# Patient Record
Sex: FEMALE | Race: WHITE | NOT HISPANIC OR LATINO | URBAN - METROPOLITAN AREA
[De-identification: names, ages, dates, MRNs, and addresses within clinical notes are randomized per-mention and may not be internally consistent; named-entity substitution may affect disease eponyms.]

---

## 2021-11-18 ENCOUNTER — INPATIENT (INPATIENT)
Facility: HOSPITAL | Age: 86
LOS: 4 days | Discharge: HOME | DRG: 189 | End: 2021-11-23
Attending: STUDENT IN AN ORGANIZED HEALTH CARE EDUCATION/TRAINING PROGRAM | Admitting: STUDENT IN AN ORGANIZED HEALTH CARE EDUCATION/TRAINING PROGRAM
Payer: MEDICARE

## 2021-11-18 VITALS
WEIGHT: 111.99 LBS | DIASTOLIC BLOOD PRESSURE: 74 MMHG | HEART RATE: 80 BPM | SYSTOLIC BLOOD PRESSURE: 146 MMHG | TEMPERATURE: 99 F | RESPIRATION RATE: 20 BRPM | OXYGEN SATURATION: 99 % | HEIGHT: 68 IN

## 2021-11-18 DIAGNOSIS — J96.01 ACUTE RESPIRATORY FAILURE WITH HYPOXIA: ICD-10-CM

## 2021-11-18 LAB
ALBUMIN SERPL ELPH-MCNC: 3.5 G/DL — SIGNIFICANT CHANGE UP (ref 3.3–5.2)
ALP SERPL-CCNC: 76 U/L — SIGNIFICANT CHANGE UP (ref 40–120)
ALT FLD-CCNC: 10 U/L — SIGNIFICANT CHANGE UP
ANION GAP SERPL CALC-SCNC: 10 MMOL/L — SIGNIFICANT CHANGE UP (ref 5–17)
APPEARANCE UR: ABNORMAL
APTT BLD: 27.2 SEC — LOW (ref 27.5–35.5)
AST SERPL-CCNC: 20 U/L — SIGNIFICANT CHANGE UP
BACTERIA # UR AUTO: ABNORMAL
BASE EXCESS BLDA CALC-SCNC: 14.8 MMOL/L — HIGH (ref -2–3)
BASE EXCESS BLDA CALC-SCNC: 14.8 MMOL/L — HIGH (ref -2–3)
BASOPHILS # BLD AUTO: 0.03 K/UL — SIGNIFICANT CHANGE UP (ref 0–0.2)
BASOPHILS NFR BLD AUTO: 0.5 % — SIGNIFICANT CHANGE UP (ref 0–2)
BILIRUB SERPL-MCNC: 0.4 MG/DL — SIGNIFICANT CHANGE UP (ref 0.4–2)
BILIRUB UR-MCNC: NEGATIVE — SIGNIFICANT CHANGE UP
BLD GP AB SCN SERPL QL: SIGNIFICANT CHANGE UP
BLOOD GAS COMMENTS ARTERIAL: SIGNIFICANT CHANGE UP
BUN SERPL-MCNC: 28.8 MG/DL — HIGH (ref 8–20)
CALCIUM SERPL-MCNC: 9.6 MG/DL — SIGNIFICANT CHANGE UP (ref 8.6–10.2)
CHLORIDE SERPL-SCNC: 101 MMOL/L — SIGNIFICANT CHANGE UP (ref 98–107)
CO2 SERPL-SCNC: 34 MMOL/L — HIGH (ref 22–29)
COLOR SPEC: YELLOW — SIGNIFICANT CHANGE UP
COMMENT - URINE: SIGNIFICANT CHANGE UP
CREAT SERPL-MCNC: 0.99 MG/DL — SIGNIFICANT CHANGE UP (ref 0.5–1.3)
DIFF PNL FLD: NEGATIVE — SIGNIFICANT CHANGE UP
EOSINOPHIL # BLD AUTO: 0.02 K/UL — SIGNIFICANT CHANGE UP (ref 0–0.5)
EOSINOPHIL NFR BLD AUTO: 0.3 % — SIGNIFICANT CHANGE UP (ref 0–6)
EPI CELLS # UR: SIGNIFICANT CHANGE UP
GAS PNL BLDA: SIGNIFICANT CHANGE UP
GLUCOSE BLDC GLUCOMTR-MCNC: 112 MG/DL — HIGH (ref 70–99)
GLUCOSE SERPL-MCNC: 114 MG/DL — HIGH (ref 70–99)
GLUCOSE UR QL: NEGATIVE MG/DL — SIGNIFICANT CHANGE UP
GRAN CASTS # UR COMP ASSIST: ABNORMAL /LPF
HCO3 BLDA-SCNC: 40 MMOL/L — HIGH (ref 21–28)
HCO3 BLDA-SCNC: 41 MMOL/L — HIGH (ref 21–28)
HCT VFR BLD CALC: 42.3 % — SIGNIFICANT CHANGE UP (ref 34.5–45)
HGB BLD-MCNC: 12.5 G/DL — SIGNIFICANT CHANGE UP (ref 11.5–15.5)
HIV 1 & 2 AB SERPL IA.RAPID: SIGNIFICANT CHANGE UP
HOROWITZ INDEX BLDA+IHG-RTO: 0.4 — SIGNIFICANT CHANGE UP
IMM GRANULOCYTES NFR BLD AUTO: 0.5 % — SIGNIFICANT CHANGE UP (ref 0–1.5)
INR BLD: 1.13 RATIO — SIGNIFICANT CHANGE UP (ref 0.88–1.16)
KETONES UR-MCNC: NEGATIVE — SIGNIFICANT CHANGE UP
LEUKOCYTE ESTERASE UR-ACNC: NEGATIVE — SIGNIFICANT CHANGE UP
LYMPHOCYTES # BLD AUTO: 0.53 K/UL — LOW (ref 1–3.3)
LYMPHOCYTES # BLD AUTO: 8 % — LOW (ref 13–44)
MCHC RBC-ENTMCNC: 27.5 PG — SIGNIFICANT CHANGE UP (ref 27–34)
MCHC RBC-ENTMCNC: 29.6 GM/DL — LOW (ref 32–36)
MCV RBC AUTO: 93.2 FL — SIGNIFICANT CHANGE UP (ref 80–100)
MONOCYTES # BLD AUTO: 0.3 K/UL — SIGNIFICANT CHANGE UP (ref 0–0.9)
MONOCYTES NFR BLD AUTO: 4.6 % — SIGNIFICANT CHANGE UP (ref 2–14)
NEUTROPHILS # BLD AUTO: 5.68 K/UL — SIGNIFICANT CHANGE UP (ref 1.8–7.4)
NEUTROPHILS NFR BLD AUTO: 86.1 % — HIGH (ref 43–77)
NITRITE UR-MCNC: NEGATIVE — SIGNIFICANT CHANGE UP
NT-PROBNP SERPL-SCNC: 1913 PG/ML — HIGH (ref 0–300)
PCO2 BLDA: 64 MMHG — HIGH (ref 32–35)
PCO2 BLDA: 86 MMHG — CRITICAL HIGH (ref 32–35)
PH BLDA: 7.29 — LOW (ref 7.35–7.45)
PH BLDA: 7.4 — SIGNIFICANT CHANGE UP (ref 7.35–7.45)
PH UR: 6 — SIGNIFICANT CHANGE UP (ref 5–8)
PLATELET # BLD AUTO: 181 K/UL — SIGNIFICANT CHANGE UP (ref 150–400)
PO2 BLDA: 147 MMHG — HIGH (ref 83–108)
PO2 BLDA: 89 MMHG — SIGNIFICANT CHANGE UP (ref 83–108)
POTASSIUM SERPL-MCNC: 4.7 MMOL/L — SIGNIFICANT CHANGE UP (ref 3.5–5.3)
POTASSIUM SERPL-SCNC: 4.7 MMOL/L — SIGNIFICANT CHANGE UP (ref 3.5–5.3)
PROT SERPL-MCNC: 6.7 G/DL — SIGNIFICANT CHANGE UP (ref 6.6–8.7)
PROT UR-MCNC: 15 MG/DL
PROTHROM AB SERPL-ACNC: 13 SEC — SIGNIFICANT CHANGE UP (ref 10.6–13.6)
RAPID RVP RESULT: SIGNIFICANT CHANGE UP
RBC # BLD: 4.54 M/UL — SIGNIFICANT CHANGE UP (ref 3.8–5.2)
RBC # FLD: 13.5 % — SIGNIFICANT CHANGE UP (ref 10.3–14.5)
RBC CASTS # UR COMP ASSIST: SIGNIFICANT CHANGE UP /HPF (ref 0–4)
SAO2 % BLDA: 97.9 % — SIGNIFICANT CHANGE UP (ref 94–98)
SAO2 % BLDA: 99.6 % — HIGH (ref 94–98)
SARS-COV-2 RNA SPEC QL NAA+PROBE: SIGNIFICANT CHANGE UP
SODIUM SERPL-SCNC: 145 MMOL/L — SIGNIFICANT CHANGE UP (ref 135–145)
SP GR SPEC: 1.02 — SIGNIFICANT CHANGE UP (ref 1.01–1.02)
TROPONIN T SERPL-MCNC: 0.05 NG/ML — SIGNIFICANT CHANGE UP (ref 0–0.06)
UROBILINOGEN FLD QL: NEGATIVE MG/DL — SIGNIFICANT CHANGE UP
WBC # BLD: 6.59 K/UL — SIGNIFICANT CHANGE UP (ref 3.8–10.5)
WBC # FLD AUTO: 6.59 K/UL — SIGNIFICANT CHANGE UP (ref 3.8–10.5)
WBC UR QL: SIGNIFICANT CHANGE UP

## 2021-11-18 PROCEDURE — 99291 CRITICAL CARE FIRST HOUR: CPT | Mod: CS,GC

## 2021-11-18 PROCEDURE — 72125 CT NECK SPINE W/O DYE: CPT | Mod: 26,MD

## 2021-11-18 PROCEDURE — 70450 CT HEAD/BRAIN W/O DYE: CPT | Mod: 26,MD

## 2021-11-18 PROCEDURE — 71045 X-RAY EXAM CHEST 1 VIEW: CPT | Mod: 26

## 2021-11-18 PROCEDURE — 72192 CT PELVIS W/O DYE: CPT | Mod: 26,MA

## 2021-11-18 PROCEDURE — 99223 1ST HOSP IP/OBS HIGH 75: CPT

## 2021-11-18 PROCEDURE — 93010 ELECTROCARDIOGRAM REPORT: CPT

## 2021-11-18 RX ORDER — FUROSEMIDE 40 MG
40 TABLET ORAL ONCE
Refills: 0 | Status: COMPLETED | OUTPATIENT
Start: 2021-11-18 | End: 2021-11-18

## 2021-11-18 RX ORDER — DEXTROSE 50 % IN WATER 50 %
12.5 SYRINGE (ML) INTRAVENOUS ONCE
Refills: 0 | Status: DISCONTINUED | OUTPATIENT
Start: 2021-11-18 | End: 2021-11-23

## 2021-11-18 RX ORDER — ASPIRIN/CALCIUM CARB/MAGNESIUM 324 MG
300 TABLET ORAL DAILY
Refills: 0 | Status: DISCONTINUED | OUTPATIENT
Start: 2021-11-18 | End: 2021-11-19

## 2021-11-18 RX ORDER — SODIUM CHLORIDE 9 MG/ML
500 INJECTION INTRAMUSCULAR; INTRAVENOUS; SUBCUTANEOUS ONCE
Refills: 0 | Status: DISCONTINUED | OUTPATIENT
Start: 2021-11-18 | End: 2021-11-18

## 2021-11-18 RX ORDER — ACETAMINOPHEN 500 MG
650 TABLET ORAL EVERY 6 HOURS
Refills: 0 | Status: DISCONTINUED | OUTPATIENT
Start: 2021-11-18 | End: 2021-11-23

## 2021-11-18 RX ORDER — ASPIRIN/CALCIUM CARB/MAGNESIUM 324 MG
81 TABLET ORAL DAILY
Refills: 0 | Status: DISCONTINUED | OUTPATIENT
Start: 2021-11-18 | End: 2021-11-18

## 2021-11-18 RX ORDER — SODIUM CHLORIDE 9 MG/ML
1000 INJECTION, SOLUTION INTRAVENOUS
Refills: 0 | Status: DISCONTINUED | OUTPATIENT
Start: 2021-11-18 | End: 2021-11-23

## 2021-11-18 RX ORDER — GLUCAGON INJECTION, SOLUTION 0.5 MG/.1ML
1 INJECTION, SOLUTION SUBCUTANEOUS ONCE
Refills: 0 | Status: DISCONTINUED | OUTPATIENT
Start: 2021-11-18 | End: 2021-11-23

## 2021-11-18 RX ORDER — DEXTROSE 50 % IN WATER 50 %
25 SYRINGE (ML) INTRAVENOUS ONCE
Refills: 0 | Status: DISCONTINUED | OUTPATIENT
Start: 2021-11-18 | End: 2021-11-23

## 2021-11-18 RX ORDER — METOPROLOL TARTRATE 50 MG
25 TABLET ORAL
Refills: 0 | Status: DISCONTINUED | OUTPATIENT
Start: 2021-11-18 | End: 2021-11-18

## 2021-11-18 RX ORDER — ONDANSETRON 8 MG/1
4 TABLET, FILM COATED ORAL EVERY 8 HOURS
Refills: 0 | Status: DISCONTINUED | OUTPATIENT
Start: 2021-11-18 | End: 2021-11-23

## 2021-11-18 RX ORDER — METOPROLOL TARTRATE 50 MG
5 TABLET ORAL EVERY 6 HOURS
Refills: 0 | Status: DISCONTINUED | OUTPATIENT
Start: 2021-11-18 | End: 2021-11-19

## 2021-11-18 RX ORDER — SODIUM CHLORIDE 9 MG/ML
1000 INJECTION INTRAMUSCULAR; INTRAVENOUS; SUBCUTANEOUS
Refills: 0 | Status: COMPLETED | OUTPATIENT
Start: 2021-11-18 | End: 2021-11-18

## 2021-11-18 RX ORDER — DEXTROSE 50 % IN WATER 50 %
15 SYRINGE (ML) INTRAVENOUS ONCE
Refills: 0 | Status: DISCONTINUED | OUTPATIENT
Start: 2021-11-18 | End: 2021-11-23

## 2021-11-18 RX ORDER — ENOXAPARIN SODIUM 100 MG/ML
30 INJECTION SUBCUTANEOUS DAILY
Refills: 0 | Status: DISCONTINUED | OUTPATIENT
Start: 2021-11-18 | End: 2021-11-23

## 2021-11-18 RX ORDER — LANOLIN ALCOHOL/MO/W.PET/CERES
3 CREAM (GRAM) TOPICAL AT BEDTIME
Refills: 0 | Status: DISCONTINUED | OUTPATIENT
Start: 2021-11-18 | End: 2021-11-23

## 2021-11-18 RX ORDER — AZITHROMYCIN 500 MG/1
500 TABLET, FILM COATED ORAL EVERY 24 HOURS
Refills: 0 | Status: COMPLETED | OUTPATIENT
Start: 2021-11-18 | End: 2021-11-22

## 2021-11-18 RX ADMIN — AZITHROMYCIN 255 MILLIGRAM(S): 500 TABLET, FILM COATED ORAL at 18:54

## 2021-11-18 RX ADMIN — Medication 40 MILLIGRAM(S): at 18:31

## 2021-11-18 RX ADMIN — ENOXAPARIN SODIUM 30 MILLIGRAM(S): 100 INJECTION SUBCUTANEOUS at 18:30

## 2021-11-18 RX ADMIN — Medication 40 MILLIGRAM(S): at 16:10

## 2021-11-18 RX ADMIN — SODIUM CHLORIDE 55 MILLILITER(S): 9 INJECTION INTRAMUSCULAR; INTRAVENOUS; SUBCUTANEOUS at 18:30

## 2021-11-18 NOTE — ED ADULT NURSE NOTE - OBJECTIVE STATEMENT
Patient A&O, here for weakness and an unwitnessed fall, as per daughter. As per daughter, patient slid off of her bed yesterday with no apparent injuries. Patient has been reported to have been physically declining over the past two weeks and was cognitively intact yesterday. Patent's daughter reported patient is b12 deficient, has a HX of HTN, CHF, and takes a baby aspirin daily. Patient is not on medication for CHF at this time. Patient placed on 2 liters of 02. O2 stat in the high 80's on room air.

## 2021-11-18 NOTE — ED ADULT TRIAGE NOTE - CHIEF COMPLAINT QUOTE
generalized weakness unknown onset of symptoms. denies falling denies loc denies blood thinners. pt has hx alzheimers. confused at baseline

## 2021-11-18 NOTE — ED PROVIDER NOTE - PROGRESS NOTE DETAILS
Stan: SAMARA completed with family.  On reevaluation, pt more obtunded.  Repeat ABG showing worsening hypercarbia.  Placed on AVAPS.  Will give lasix. patient on AVAPS, responding to name now. will continue to monitor

## 2021-11-18 NOTE — ED PROVIDER NOTE - CLINICAL SUMMARY MEDICAL DECISION MAKING FREE TEXT BOX
89y F w/ dementia, CHF, COPD, presenting for fall/AMS.  Pt lethargic and satting 86% on RA on arrival.  Placed on 2 L NC.  Will obtain trauma workup, ABG, labs, EKG, CXR, UA, admit.

## 2021-11-18 NOTE — ED PROVIDER NOTE - OBJECTIVE STATEMENT
89y F w/ hx CHF, COPD, Alzheimer's dementia, presenting for fall/altered mental status.  Per daughter, pt had been otherwise in her usual state of health over the past few days.  Pt normally ambulatory at baseline; just went to [facility] yesterday.  This morning, daughter heard call to her, and found pt on the ground out of bed.  Pt more lethargic compared to baseline; denies any complaints.  No blood thinner use.  Pt is DNR/DNI.

## 2021-11-18 NOTE — H&P ADULT - NSHPPHYSICALEXAM_GEN_ALL_CORE
GENERAL: elderly frail female examined bedside, appears uncomfortable moderate distress  HEENT: NC/AT, dry oral mucosa, clear conjunctiva, sclera nonicteric  RESPIRATORY: increased WOB, on AVAPS, decreased air entry b/l, no audible wheezing, rhonchi, rales appreciated  CARDIOVASCULAR: RRR, normal S1 and S2  ABDOMEN: soft, NT/ND, normoactive bowel sounds, no rebound/guarding  MSK: No joint deformities, edema, erythema  EXTREMITIES: No cynaosis, no clubbing, no lower extremity edema; Peripheral pulses are 2+ bilaterally  PSYCH: unable to assess as pt is lethargic   NEUROLOGY: not awake but withdraws to noxious stimuli  SKIN: No rashes or no palpable lesions

## 2021-11-18 NOTE — ED PROVIDER NOTE - PHYSICAL EXAMINATION
Constitutional: Awake, alert, lethargic but easily rousable, in no acute distress  Eyes: no scleral icterus  HENT: normocephalic, atraumatic, moist oral mucosa  Neck: supple, no tenderness  CV: RRR, no murmur  Pulm: non-labored respirations, CTAB  Abdomen: soft, non-tender, non-distended  Extremities: no edema, no deformity, no tenderness or decreased ROM  Skin: no rash, no jaundice  Neuro: AAOx1, moving all extremities equally

## 2021-11-18 NOTE — H&P ADULT - NSHPLABSRESULTS_GEN_ALL_CORE
12.5   6.59  )-----------( 181      ( 18 Nov 2021 09:18 )             42.3         11-18    145  |  101  |  28.8<H>  ----------------------------<  114<H>  4.7   |  34.0<H>  |  0.99    Ca    9.6      18 Nov 2021 09:18    TPro  6.7  /  Alb  3.5  /  TBili  0.4  /  DBili  x   /  AST  20  /  ALT  10  /  AlkPhos  76  11-18        < from: CT Head No Cont (11.18.21 @ 11:01) >    Brain:    Extensive parenchymal volume loss and chronic microvascular ischemic changes are identified    There is no evidence acute hemorrhage mass or mass effect seen    Evaluation of the osseous structures with the appropriate window appears normal    The patient is status post bilateral cataract surgery.    The patient is status post bilateral scleral banding.    The visualized paranasal sinuses mastoid and middle ear regions appear clear.    Cervical spine:    Demineralization of the bones is seen.    Loss of the normal cervical lordosis is seen    Scoliosis is seen.    C4 is slightly anteriorly displaced relative to C5. This likely the result of chronic degenerative change    Bilateral hypertrophic facet joint is seen at multiple levels. This is secondary to chronic degenerative change    There are no acute fractures or dislocations identified    Evaluation of the paraspinal soft tissues is limited by lack of IV contrast. There is a left-sided thyroid lesion. Dedicated imaging of the thyroid can be done with CT scan or ultrasound if clinically indicated.    The visualized portions of both lung apices appear clear.    IMPRESSION: No evidence acute hemorrhage mass mass effect in the posterior fossa or supratentorial region.    Degenerative change involving the cervical spine without evidence of fracture or dislocation seen.    Left thyroid lesion.    < end of copied text >

## 2021-11-18 NOTE — H&P ADULT - NSHPSOCIALHISTORY_GEN_ALL_CORE
lives with daughter, needs moderate assistance with ADLs, former smoker, no etoh or illicit drug use

## 2021-11-18 NOTE — H&P ADULT - ASSESSMENT
89 y/o F w/ PMH of advanced dementia, HTN, COPD was BIBA from respiratory distress.  Pt unable to provide history at this time and was obtained from daughter.  Pt lives with daughter who noted her to have increased work of breathing for the past few days. Daughter also reports that pt slid out of bed yesterday falling to the ground slowly and but had no LOC or head trauma.  On arrival to the ED pt noted to be hypoxic w/ O2 sat 86 ORA w/ increased wob and placed on supplemental O2.  Pt became more lethargic and ABG was performed which showed PaCO2 86 and patient was placed on NIV in the form of AVAPS.  Pt will be admitted to the SDU for further management.        Acute hypoxic hypercapnic respiratory failure possibly 2/2 COPD exacerbation   - Admit to step down unit  - CXR reviewed and no obvious consolidation or infiltrate; report pending    - Respiratory acidosis on ABG w/ PaCO2 86 on room air   - Placed on AVAPS in ED, will do repeat ABG to reassess acid base disturbance  - Will place on short course of IV steroids and azithromycin     - Aspiration precautions and HOB elevation to 30 degrees  - Frequent suctioning of secretions if needed  - Chest PT as tolerated    - NPO while on NIV      Acute metabolic encephalopathy 2/2 CO2 narcosis   - ABG reviwed and CO2 >80  - Mild improvement in mentation since placing on AVAPS  - Will order repeat ABG to reassess CO2   - CTH negative for acute pathology   - Monitor mentation       HTN  - Metoprolol 25mg BID      Advanced dementia   - On Nam[employer] but non formulary       VTE ppx: lovenox SQ    Code Status: DNR/DNI

## 2021-11-18 NOTE — H&P ADULT - HISTORY OF PRESENT ILLNESS
89 y/o F w/ PMH of advanced dementia, HTN was BIBA from respiratory distress.  Pt unable to provide history at this time and was obtained from daughter.  Pt lives with daughter who noted her to have increased work of breathing for the past few days.  She used an inhaler that provided no relief.  Today daughter noted pt to be breathing very fast and shallow and was less responsive.  Daughter denies pt being febrile or having cough.  She has had no sick contacts that she knows of.   Daughter also reports that pt slid out of bed yesterday falling to the ground slowly and but had no LOC or head trauma.

## 2021-11-18 NOTE — ED PROVIDER NOTE - NSICDXPASTMEDICALHX_GEN_ALL_CORE_FT
PAST MEDICAL HISTORY:  Alzheimer disease     Chronic obstructive pulmonary disease (COPD)     Congestive heart failure (CHF)

## 2021-11-18 NOTE — ED PROVIDER NOTE - ATTENDING CONTRIBUTION TO CARE
89y F w/ hx CHF, HTN, Alzheimer's dementia, presenting for fall/altered mental status. Baseline ambulatory. This morning found patient on ground, slipped out of bed. In the last week patient more tired than usual, getting B12 therapy recently. Not on home O2. never smoker. Takes metoprolol and aspirin only. Was able to go to [facility] yesterday at normal activity level. Patient unable to provide history. Vaccinated against covid19. Daughter denies recent fever, abd pain, diarrhea.  Ap - arousable to voice. hypoxic to 86% on RA. not on home o2. no signs of volume overload. will get rectal temp. infectious w/up. CT r/o traumatic injury. anticipate admission

## 2021-11-19 LAB
ALBUMIN SERPL ELPH-MCNC: 3.6 G/DL — SIGNIFICANT CHANGE UP (ref 3.3–5.2)
ALP SERPL-CCNC: 73 U/L — SIGNIFICANT CHANGE UP (ref 40–120)
ALT FLD-CCNC: 8 U/L — SIGNIFICANT CHANGE UP
ANION GAP SERPL CALC-SCNC: 13 MMOL/L — SIGNIFICANT CHANGE UP (ref 5–17)
AST SERPL-CCNC: 16 U/L — SIGNIFICANT CHANGE UP
BASE EXCESS BLDA CALC-SCNC: 13.4 MMOL/L — HIGH (ref -2–3)
BASE EXCESS BLDA CALC-SCNC: 17.5 MMOL/L — HIGH (ref -2–3)
BASOPHILS # BLD AUTO: 0 K/UL — SIGNIFICANT CHANGE UP (ref 0–0.2)
BASOPHILS NFR BLD AUTO: 0 % — SIGNIFICANT CHANGE UP (ref 0–2)
BILIRUB SERPL-MCNC: 0.6 MG/DL — SIGNIFICANT CHANGE UP (ref 0.4–2)
BLOOD GAS COMMENTS ARTERIAL: SIGNIFICANT CHANGE UP
BLOOD GAS COMMENTS ARTERIAL: SIGNIFICANT CHANGE UP
BUN SERPL-MCNC: 31.5 MG/DL — HIGH (ref 8–20)
CALCIUM SERPL-MCNC: 8.9 MG/DL — SIGNIFICANT CHANGE UP (ref 8.6–10.2)
CHLORIDE SERPL-SCNC: 96 MMOL/L — LOW (ref 98–107)
CO2 SERPL-SCNC: 35 MMOL/L — HIGH (ref 22–29)
COVID-19 NUCLEOCAPSID GAM AB INTERP: NEGATIVE — SIGNIFICANT CHANGE UP
COVID-19 NUCLEOCAPSID TOTAL GAM ANTIBODY RESULT: 0.08 INDEX — SIGNIFICANT CHANGE UP
COVID-19 SPIKE DOMAIN AB INTERP: POSITIVE
COVID-19 SPIKE DOMAIN ANTIBODY RESULT: >250 U/ML — HIGH
CREAT SERPL-MCNC: 1.07 MG/DL — SIGNIFICANT CHANGE UP (ref 0.5–1.3)
EOSINOPHIL # BLD AUTO: 0 K/UL — SIGNIFICANT CHANGE UP (ref 0–0.5)
EOSINOPHIL NFR BLD AUTO: 0 % — SIGNIFICANT CHANGE UP (ref 0–6)
GLUCOSE BLDC GLUCOMTR-MCNC: 104 MG/DL — HIGH (ref 70–99)
GLUCOSE BLDC GLUCOMTR-MCNC: 124 MG/DL — HIGH (ref 70–99)
GLUCOSE SERPL-MCNC: 125 MG/DL — HIGH (ref 70–99)
HCO3 BLDA-SCNC: 34 MMOL/L — HIGH (ref 21–28)
HCO3 BLDA-SCNC: 43 MMOL/L — HIGH (ref 21–28)
HCT VFR BLD CALC: 38.4 % — SIGNIFICANT CHANGE UP (ref 34.5–45)
HGB BLD-MCNC: 11.7 G/DL — SIGNIFICANT CHANGE UP (ref 11.5–15.5)
HOROWITZ INDEX BLDA+IHG-RTO: 30 — SIGNIFICANT CHANGE UP
HOROWITZ INDEX BLDA+IHG-RTO: SIGNIFICANT CHANGE UP
IMM GRANULOCYTES NFR BLD AUTO: 0.4 % — SIGNIFICANT CHANGE UP (ref 0–1.5)
LYMPHOCYTES # BLD AUTO: 0.48 K/UL — LOW (ref 1–3.3)
LYMPHOCYTES # BLD AUTO: 10.3 % — LOW (ref 13–44)
MAGNESIUM SERPL-MCNC: 2 MG/DL — SIGNIFICANT CHANGE UP (ref 1.6–2.6)
MCHC RBC-ENTMCNC: 27.5 PG — SIGNIFICANT CHANGE UP (ref 27–34)
MCHC RBC-ENTMCNC: 30.5 GM/DL — LOW (ref 32–36)
MCV RBC AUTO: 90.1 FL — SIGNIFICANT CHANGE UP (ref 80–100)
MONOCYTES # BLD AUTO: 0.2 K/UL — SIGNIFICANT CHANGE UP (ref 0–0.9)
MONOCYTES NFR BLD AUTO: 4.3 % — SIGNIFICANT CHANGE UP (ref 2–14)
NEUTROPHILS # BLD AUTO: 3.98 K/UL — SIGNIFICANT CHANGE UP (ref 1.8–7.4)
NEUTROPHILS NFR BLD AUTO: 85 % — HIGH (ref 43–77)
PCO2 BLDA: 31 MMHG — LOW (ref 32–35)
PCO2 BLDA: 74 MMHG — CRITICAL HIGH (ref 32–35)
PH BLDA: 7.37 — SIGNIFICANT CHANGE UP (ref 7.35–7.45)
PH BLDA: 7.65 — CRITICAL HIGH (ref 7.35–7.45)
PHOSPHATE SERPL-MCNC: 2.9 MG/DL — SIGNIFICANT CHANGE UP (ref 2.4–4.7)
PLATELET # BLD AUTO: 183 K/UL — SIGNIFICANT CHANGE UP (ref 150–400)
PO2 BLDA: 123 MMHG — HIGH (ref 83–108)
PO2 BLDA: 77 MMHG — LOW (ref 83–108)
POTASSIUM SERPL-MCNC: 4.5 MMOL/L — SIGNIFICANT CHANGE UP (ref 3.5–5.3)
POTASSIUM SERPL-SCNC: 4.5 MMOL/L — SIGNIFICANT CHANGE UP (ref 3.5–5.3)
PROT SERPL-MCNC: 6.5 G/DL — LOW (ref 6.6–8.7)
RBC # BLD: 4.26 M/UL — SIGNIFICANT CHANGE UP (ref 3.8–5.2)
RBC # FLD: 13.4 % — SIGNIFICANT CHANGE UP (ref 10.3–14.5)
SAO2 % BLDA: 96.2 % — SIGNIFICANT CHANGE UP (ref 94–98)
SAO2 % BLDA: 99.5 % — HIGH (ref 94–98)
SARS-COV-2 IGG+IGM SERPL QL IA: 0.08 INDEX — SIGNIFICANT CHANGE UP
SARS-COV-2 IGG+IGM SERPL QL IA: >250 U/ML — HIGH
SARS-COV-2 IGG+IGM SERPL QL IA: NEGATIVE — SIGNIFICANT CHANGE UP
SARS-COV-2 IGG+IGM SERPL QL IA: POSITIVE
SODIUM SERPL-SCNC: 144 MMOL/L — SIGNIFICANT CHANGE UP (ref 135–145)
WBC # BLD: 4.68 K/UL — SIGNIFICANT CHANGE UP (ref 3.8–10.5)
WBC # FLD AUTO: 4.68 K/UL — SIGNIFICANT CHANGE UP (ref 3.8–10.5)

## 2021-11-19 PROCEDURE — 99222 1ST HOSP IP/OBS MODERATE 55: CPT

## 2021-11-19 PROCEDURE — 99233 SBSQ HOSP IP/OBS HIGH 50: CPT

## 2021-11-19 PROCEDURE — 74018 RADEX ABDOMEN 1 VIEW: CPT | Mod: 26

## 2021-11-19 RX ORDER — SENNA PLUS 8.6 MG/1
2 TABLET ORAL AT BEDTIME
Refills: 0 | Status: DISCONTINUED | OUTPATIENT
Start: 2021-11-19 | End: 2021-11-23

## 2021-11-19 RX ORDER — POLYETHYLENE GLYCOL 3350 17 G/17G
17 POWDER, FOR SOLUTION ORAL DAILY
Refills: 0 | Status: DISCONTINUED | OUTPATIENT
Start: 2021-11-19 | End: 2021-11-23

## 2021-11-19 RX ORDER — METOPROLOL TARTRATE 50 MG
25 TABLET ORAL
Refills: 0 | Status: DISCONTINUED | OUTPATIENT
Start: 2021-11-20 | End: 2021-11-20

## 2021-11-19 RX ORDER — ASPIRIN/CALCIUM CARB/MAGNESIUM 324 MG
81 TABLET ORAL DAILY
Refills: 0 | Status: DISCONTINUED | OUTPATIENT
Start: 2021-11-19 | End: 2021-11-23

## 2021-11-19 RX ORDER — METOPROLOL TARTRATE 50 MG
25 TABLET ORAL ONCE
Refills: 0 | Status: COMPLETED | OUTPATIENT
Start: 2021-11-19 | End: 2021-11-19

## 2021-11-19 RX ADMIN — Medication 40 MILLIGRAM(S): at 05:28

## 2021-11-19 RX ADMIN — Medication 25 MILLIGRAM(S): at 20:25

## 2021-11-19 RX ADMIN — SENNA PLUS 2 TABLET(S): 8.6 TABLET ORAL at 21:19

## 2021-11-19 RX ADMIN — Medication 81 MILLIGRAM(S): at 14:53

## 2021-11-19 RX ADMIN — POLYETHYLENE GLYCOL 3350 17 GRAM(S): 17 POWDER, FOR SOLUTION ORAL at 17:57

## 2021-11-19 RX ADMIN — AZITHROMYCIN 255 MILLIGRAM(S): 500 TABLET, FILM COATED ORAL at 17:58

## 2021-11-19 RX ADMIN — Medication 5 MILLIGRAM(S): at 13:07

## 2021-11-19 RX ADMIN — ENOXAPARIN SODIUM 30 MILLIGRAM(S): 100 INJECTION SUBCUTANEOUS at 13:07

## 2021-11-19 NOTE — PROGRESS NOTE ADULT - ASSESSMENT
89 y/o F w/ PMH of advanced dementia, HTN, COPD was BIBA from respiratory distress.  Pt unable to provide history at this time and was obtained from daughter.  Pt lives with daughter who noted her to have increased work of breathing for the past few days. Daughter also reports that pt slid out of bed yesterday falling to the ground slowly and but had no LOC or head trauma.  On arrival to the ED pt noted to be hypoxic w/ O2 sat 86 ORA w/ increased wob and placed on supplemental O2.  Pt became more lethargic and ABG was performed which showed PaCO2 86 and patient was placed on NIV in the form of AVAPS.  Admitted to the SDU for further management.        #Acute hypoxic hypercapnic respiratory failure possibly 2/2 COPD exacerbation   - CXR reviewed and no obvious consolidation or infiltrate=    - Respiratory acidosis on ABG w/ PaCO2 86 on room air   - Placed on AVAPS in E  - IV steroids and azithromycin     - Aspiration precautions and HOB elevation to 30 degrees  - Frequent suctioning of secretions if needed  - Chest PT as tolerated    - NPO while on NIV  - AVAPS nightly, NC during day - wean as toelrated    #Acute metabolic encephalopathy 2/2 CO2 narcosis and urinary retention  Reported to have mild improvement of mentation overnight with AVAPS.   Non alert on am rounds 11/19. Improved after straight cath and resolution of urinary obstruction  CTH negative for acute pathology   - Monitor mentation       #HTN  - Metoprolol 25mg BID      #Advanced dementia   - On [employer] but non formulary       VTE ppx: lovenox SQ    Code Status: DNR/DNI  Dispo: Remain acute pending improvement in oxygenation.     89 y/o F w/ PMH of advanced dementia, HTN, COPD was BIBA from respiratory distress.  Pt unable to provide history at this time and was obtained from daughter.  Pt lives with daughter who noted her to have increased work of breathing for the past few days. Daughter also reports that pt slid out of bed yesterday falling to the ground slowly and but had no LOC or head trauma.  On arrival to the ED pt noted to be hypoxic w/ O2 sat 86 ORA w/ increased wob and placed on supplemental O2.  Pt became more lethargic and ABG was performed which showed PaCO2 86 and patient was placed on NIV in the form of AVAPS.  Admitted to the SDU for further management.        #Acute hypoxic hypercapnic respiratory failure possibly 2/2 COPD exacerbation   - CXR reviewed and no obvious consolidation or infiltrate=    - Respiratory acidosis on ABG w/ PaCO2 86 on room air   - Placed on AVAPS in E  - IV steroids and azithromycin     - Aspiration precautions and HOB elevation to 30 degrees  - Frequent suctioning of secretions if needed  - Chest PT as tolerated    - NPO while on NIV  - AVAPS nightly, NC during day - wean as toelrated    #Acute metabolic encephalopathy 2/2 CO2 narcosis and urinary retention  Reported to have mild improvement of mentation overnight with AVAPS.   Non alert on am rounds 11/19. Improved after straight cath and resolution of urinary obstruction  CTH negative for acute pathology   - Monitor mentation     #Urinary retention suspect 2.2 to constipation x 3-4 days as per daughter at bedside   Miralax/Senna   Dulcolax suppository  bladder scan q6h with straight cath as needed    #HTN  - Metoprolol 25mg BID      #Advanced dementia   - On Nam[employer] but non formulary       VTE ppx: lovenox SQ    Code Status: DNR/DNI  Dispo: Remain acute pending improvement in oxygenation.

## 2021-11-19 NOTE — PROGRESS NOTE ADULT - SUBJECTIVE AND OBJECTIVE BOX
[facility] Division of Hospital Medicine    Chief Complaint:      SUBJECTIVE / OVERNIGHT EVENTS:    Patient denies chest pain, SOB, abd pain, N/V, fever, chills, dysuria or any other complaints. All remainder ROS negative.     MEDICATIONS  (STANDING):  aspirin enteric coated 81 milliGRAM(s) Oral daily  azithromycin  IVPB 500 milliGRAM(s) IV Intermittent every 24 hours  bisacodyl Suppository 10 milliGRAM(s) Rectal once  dextrose 40% Gel 15 Gram(s) Oral once  dextrose 5%. 1000 milliLiter(s) (50 mL/Hr) IV Continuous <Continuous>  dextrose 5%. 1000 milliLiter(s) (100 mL/Hr) IV Continuous <Continuous>  dextrose 50% Injectable 25 Gram(s) IV Push once  dextrose 50% Injectable 12.5 Gram(s) IV Push once  dextrose 50% Injectable 25 Gram(s) IV Push once  enoxaparin Injectable 30 milliGRAM(s) SubCutaneous daily  glucagon  Injectable 1 milliGRAM(s) IntraMuscular once  methylPREDNISolone sodium succinate Injectable 40 milliGRAM(s) IV Push daily  metoprolol tartrate Injectable 5 milliGRAM(s) IV Push every 6 hours  polyethylene glycol 3350 17 Gram(s) Oral daily  senna 2 Tablet(s) Oral at bedtime    MEDICATIONS  (PRN):  acetaminophen     Tablet .. 650 milliGRAM(s) Oral every 6 hours PRN Temp greater or equal to 38C (100.4F), Mild Pain (1 - 3)  aluminum hydroxide/magnesium hydroxide/simethicone Suspension 30 milliLiter(s) Oral every 4 hours PRN Dyspepsia  melatonin 3 milliGRAM(s) Oral at bedtime PRN Insomnia  ondansetron Injectable 4 milliGRAM(s) IV Push every 8 hours PRN Nausea and/or Vomiting        I&O's Summary    2021 07:  -  2021 07:00  --------------------------------------------------------  IN: 0 mL / OUT: 500 mL / NET: -500 mL    2021 07:  -  2021 16:30  --------------------------------------------------------  IN: 0 mL / OUT: 100 mL / NET: -100 mL        PHYSICAL EXAM:  Vital Signs Last 24 Hrs  T(C): 36.7 (2021 12:00), Max: 37.1 (2021 04:00)  T(F): 98 (2021 12:00), Max: 98.8 (2021 04:00)  HR: 78 (2021 13:00) (51 - 80)  BP: 146/78 (2021 12:00) (100/55 - 146/78)  BP(mean): 87 (2021 12:00) (71 - 87)  RR: 17 (2021 12:00) (17 - 34)  SpO2: 100% (2021 13:00) (96% - 100%)        CONSTITUTIONAL: NAD, Elderly, chronically ill appearing. On PPV  ENMT: Moist oral mucosa, no pharyngeal injection or exudates; normal dentition  RESPIRATORY: Normal respiratory effort; lungs are clear to auscultation bilaterally  CARDIOVASCULAR: Regular rate and rhythm, normal S1 and S2, no murmur/rub/gallop; Peripheral pulses are 2+ bilaterally  ABDOMEN: Distended bladder with tenderness to palpation. normoactive bowel sounds, no rebound/guarding;   MUSCLOSKELETAL:  No clubbing or cyanosis of digits; no joint swelling or tenderness to palpation  PSYCH: Not alert or oriented. Grimaces ot painful stimuli  NEUROLOGY: Unable ot participate in neuro exam.  SKIN: No rashes; no palpable lesions    LABS:                        11.7   4.68  )-----------( 183      ( 2021 05:14 )             38.4     -    144  |  96<L>  |  31.5<H>  ----------------------------<  125<H>  4.5   |  35.0<H>  |  1.07    Ca    8.9      2021 05:14  Phos  2.9       Mg     2.0         TPro  6.5<L>  /  Alb  3.6  /  TBili  0.6  /  DBili  x   /  AST  16  /  ALT  8   /  AlkPhos  73  11-19    PT/INR - ( 2021 09:18 )   PT: 13.0 sec;   INR: 1.13 ratio         PTT - ( 2021 09:18 )  PTT:27.2 sec  CARDIAC MARKERS ( 2021 09:18 )  x     / 0.05 ng/mL / 62 U/L / x     / x          Urinalysis Basic - ( 2021 19:22 )    Color: Yellow / Appearance: Slightly Turbid / S.020 / pH: x  Gluc: x / Ketone: Negative  / Bili: Negative / Urobili: Negative mg/dL   Blood: x / Protein: 15 mg/dL / Nitrite: Negative   Leuk Esterase: Negative / RBC: 0-2 /HPF / WBC 0-2   Sq Epi: x / Non Sq Epi: Few / Bacteria: Few        CAPILLARY BLOOD GLUCOSE      POCT Blood Glucose.: 104 mg/dL (2021 12:21)  POCT Blood Glucose.: 124 mg/dL (2021 04:02)  POCT Blood Glucose.: 112 mg/dL (2021 18:50)        RADIOLOGY & ADDITIONAL TESTS:  Results Reviewed:   Imaging Personally Reviewed:  Electrocardiogram Personally Reviewed:                                           [facility] Division of Hospital Medicine    Chief Complaint:    SOB    SUBJECTIVE / OVERNIGHT EVENTS:  Admitted to medicine overnight.  Started on AVAP with improvement in hypercarbia   Poor mentation on interview this morning.   Appears uncomfortable on abdominal palpation  Unable to obtain ROS     MEDICATIONS  (STANDING):  aspirin enteric coated 81 milliGRAM(s) Oral daily  azithromycin  IVPB 500 milliGRAM(s) IV Intermittent every 24 hours  bisacodyl Suppository 10 milliGRAM(s) Rectal once  dextrose 40% Gel 15 Gram(s) Oral once  dextrose 5%. 1000 milliLiter(s) (50 mL/Hr) IV Continuous <Continuous>  dextrose 5%. 1000 milliLiter(s) (100 mL/Hr) IV Continuous <Continuous>  dextrose 50% Injectable 25 Gram(s) IV Push once  dextrose 50% Injectable 12.5 Gram(s) IV Push once  dextrose 50% Injectable 25 Gram(s) IV Push once  enoxaparin Injectable 30 milliGRAM(s) SubCutaneous daily  glucagon  Injectable 1 milliGRAM(s) IntraMuscular once  methylPREDNISolone sodium succinate Injectable 40 milliGRAM(s) IV Push daily  metoprolol tartrate Injectable 5 milliGRAM(s) IV Push every 6 hours  polyethylene glycol 3350 17 Gram(s) Oral daily  senna 2 Tablet(s) Oral at bedtime    MEDICATIONS  (PRN):  acetaminophen     Tablet .. 650 milliGRAM(s) Oral every 6 hours PRN Temp greater or equal to 38C (100.4F), Mild Pain (1 - 3)  aluminum hydroxide/magnesium hydroxide/simethicone Suspension 30 milliLiter(s) Oral every 4 hours PRN Dyspepsia  melatonin 3 milliGRAM(s) Oral at bedtime PRN Insomnia  ondansetron Injectable 4 milliGRAM(s) IV Push every 8 hours PRN Nausea and/or Vomiting        I&O's Summary    2021 07:  -  2021 07:00  --------------------------------------------------------  IN: 0 mL / OUT: 500 mL / NET: -500 mL    2021 07:  -  2021 16:30  --------------------------------------------------------  IN: 0 mL / OUT: 100 mL / NET: -100 mL        PHYSICAL EXAM:  Vital Signs Last 24 Hrs  T(C): 36.7 (2021 12:00), Max: 37.1 (2021 04:00)  T(F): 98 (2021 12:00), Max: 98.8 (2021 04:00)  HR: 78 (2021 13:00) (51 - 80)  BP: 146/78 (2021 12:00) (100/55 - 146/78)  BP(mean): 87 (2021 12:00) (71 - 87)  RR: 17 (2021 12:00) (17 - 34)  SpO2: 100% (2021 13:00) (96% - 100%)        CONSTITUTIONAL: NAD, Elderly, chronically ill appearing. On PPV  ENMT: Moist oral mucosa, no pharyngeal injection or exudates; normal dentition  RESPIRATORY: Normal respiratory effort; lungs are clear to auscultation bilaterally  CARDIOVASCULAR: Regular rate and rhythm, normal S1 and S2, no murmur/rub/gallop; Peripheral pulses are 2+ bilaterally  ABDOMEN: Distended bladder with tenderness to palpation. normoactive bowel sounds, no rebound/guarding;   MUSCLOSKELETAL:  No clubbing or cyanosis of digits; no joint swelling or tenderness to palpation  PSYCH: Not alert or oriented. Grimaces ot painful stimuli  NEUROLOGY: Unable ot participate in neuro exam. moves upper extremities spontaneously  SKIN: No rashes; no palpable lesions    LABS:                        11.7   4.68  )-----------( 183      ( 2021 05:14 )             38.4     -    144  |  96<L>  |  31.5<H>  ----------------------------<  125<H>  4.5   |  35.0<H>  |  1.07    Ca    8.9      2021 05:14  Phos  2.9       Mg     2.0         TPro  6.5<L>  /  Alb  3.6  /  TBili  0.6  /  DBili  x   /  AST  16  /  ALT  8   /  AlkPhos  73  11-19    PT/INR - ( 2021 09:18 )   PT: 13.0 sec;   INR: 1.13 ratio         PTT - ( 2021 09:18 )  PTT:27.2 sec  CARDIAC MARKERS ( 2021 09:18 )  x     / 0.05 ng/mL / 62 U/L / x     / x          Urinalysis Basic - ( 2021 19:22 )    Color: Yellow / Appearance: Slightly Turbid / S.020 / pH: x  Gluc: x / Ketone: Negative  / Bili: Negative / Urobili: Negative mg/dL   Blood: x / Protein: 15 mg/dL / Nitrite: Negative   Leuk Esterase: Negative / RBC: 0-2 /HPF / WBC 0-2   Sq Epi: x / Non Sq Epi: Few / Bacteria: Few        CAPILLARY BLOOD GLUCOSE      POCT Blood Glucose.: 104 mg/dL (2021 12:21)  POCT Blood Glucose.: 124 mg/dL (2021 04:02)  POCT Blood Glucose.: 112 mg/dL (2021 18:50)        RADIOLOGY & ADDITIONAL TESTS:  Results Reviewed:   Imaging Personally Reviewed:  Electrocardiogram Personally Reviewed:

## 2021-11-19 NOTE — CONSULT NOTE ADULT - SUBJECTIVE AND OBJECTIVE BOX
ACUTE CARE SURGERY CONSULT    Time Consult Called: 7:00 PM  Time Consult Seen: 7:25 PM    HPI:  89 y/o F w/ PMH of advanced dementia, HTN was BIBA from respiratory distress.  Pt unable to provide history at this time and was obtained from daughter.  Pt lives with daughter who noted her to have increased work of breathing for the past few days.  She used an inhaler that provided no relief.  Today daughter noted pt to be breathing very fast and shallow and was less responsive.  Daughter denies pt being febrile or having cough.  She has had no sick contacts that she knows of.   Daughter also reports that pt slid out of bed yesterday falling to the ground slowly and but had no LOC or head trauma.     A[[re         (2021 16:12)      PAST MEDICAL HISTORY:  Alzheimer disease    Congestive heart failure (CHF)    Chronic obstructive pulmonary disease (COPD)        PAST SURGICAL HISTORY:  No significant past surgical history    No significant past surgical history        ALLERGIES:  american cockroaches (Rash)  No Known Drug Allergies  Silk (Unknown)      FAMILY HISTORY: Noncontributory    SOCIAL HISTORY: Denies tobacco, EtOH, illicit substance use.     HOME MEDICATIONS:    MEDICATIONS  (STANDING):  aspirin enteric coated 81 milliGRAM(s) Oral daily  azithromycin  IVPB 500 milliGRAM(s) IV Intermittent every 24 hours  bisacodyl Suppository 10 milliGRAM(s) Rectal once  dextrose 40% Gel 15 Gram(s) Oral once  dextrose 5%. 1000 milliLiter(s) (50 mL/Hr) IV Continuous <Continuous>  dextrose 5%. 1000 milliLiter(s) (100 mL/Hr) IV Continuous <Continuous>  dextrose 50% Injectable 25 Gram(s) IV Push once  dextrose 50% Injectable 12.5 Gram(s) IV Push once  dextrose 50% Injectable 25 Gram(s) IV Push once  enoxaparin Injectable 30 milliGRAM(s) SubCutaneous daily  glucagon  Injectable 1 milliGRAM(s) IntraMuscular once  methylPREDNISolone sodium succinate Injectable 40 milliGRAM(s) IV Push daily  polyethylene glycol 3350 17 Gram(s) Oral daily  senna 2 Tablet(s) Oral at bedtime    MEDICATIONS  (PRN):  acetaminophen     Tablet .. 650 milliGRAM(s) Oral every 6 hours PRN Temp greater or equal to 38C (100.4F), Mild Pain (1 - 3)  aluminum hydroxide/magnesium hydroxide/simethicone Suspension 30 milliLiter(s) Oral every 4 hours PRN Dyspepsia  melatonin 3 milliGRAM(s) Oral at bedtime PRN Insomnia  ondansetron Injectable 4 milliGRAM(s) IV Push every 8 hours PRN Nausea and/or Vomiting      VITALS & I/Os:  Vital Signs Last 24 Hrs  T(C): 36.7 (2021 20:00), Max: 37.1 (2021 04:00)  T(F): 98 (2021 20:00), Max: 98.8 (2021 04:00)  HR: 87 (:) (51 - 141)  BP: 135/76 (2021 20:00) (100/55 - 146/78)  BP(mean): 96 (:) (71 - 96)  RR: 18 (:) (17 - 34)  SpO2: 100% (:) (96% - 100%)  CAPILLARY BLOOD GLUCOSE      POCT Blood Glucose.: 104 mg/dL (2021 12:21)  POCT Blood Glucose.: 124 mg/dL (2021 04:02)      I&O's Summary    2021 07:01  -  2021 07:00  --------------------------------------------------------  IN: 0 mL / OUT: 500 mL / NET: -500 mL    2021 07:01  -  2021 21:17  --------------------------------------------------------  IN: 0 mL / OUT: 100 mL / NET: -100 mL        GENERAL: Alert, well developed, in no acute distress.  MENTAL STATUS: AAOx3. Appropriate affect.  HEENT: PERRLA. EOMI. MMM.  Trachea midline. No lymph node swelling or tenderness.  RESPIRATORY: CTAB. No wheezing, rales or rhonchi.  CARDIOVASCULAR: RRR. No audible murmurs, rubs or gallops.   GASTROINTESTINAL: Abdomen soft, NT, ND, -R/-G.  No pulsatile mass, no flank tenderness or suprapubic tenderness. No hepatosplenomegaly.  NEUROLOGIC: Cranial nerves II-XII grossly intact. No focal neurological deficits. Moves all extremities spontaneously. Sensation intact bilaterally.  INTEGUMENTARY: No overt rashes or lesions, petechia or purpura. Good turgor. No edema.  MUSCULOSKELETAL: No cyanosis or clubbing. No gross deformities.   LYMPHATIC: Palpation of neck reveals no swelling or tenderness of neck nodes. Palpation of groin reveals no swelling or tenderness of groin nodes.    LABS:                        11.7   4.68  )-----------( 183      ( 2021 05:14 )             38.4         144  |  96<L>  |  31.5<H>  ----------------------------<  125<H>  4.5   |  35.0<H>  |  1.07    Ca    8.9      2021 05:14  Phos  2.9       Mg     2.0         TPro  6.5<L>  /  Alb  3.6  /  TBili  0.6  /  DBili  x   /  AST  16  /  ALT  8   /  AlkPhos  73      Lactate: acetaminophen     Tablet .. 650 milliGRAM(s) Oral every 6 hours PRN  aluminum hydroxide/magnesium hydroxide/simethicone Suspension 30 milliLiter(s) Oral every 4 hours PRN  aspirin enteric coated 81 milliGRAM(s) Oral daily  azithromycin  IVPB 500 milliGRAM(s) IV Intermittent every 24 hours  bisacodyl Suppository 10 milliGRAM(s) Rectal once  dextrose 40% Gel 15 Gram(s) Oral once  dextrose 5%. 1000 milliLiter(s) IV Continuous <Continuous>  dextrose 5%. 1000 milliLiter(s) IV Continuous <Continuous>  dextrose 50% Injectable 25 Gram(s) IV Push once  dextrose 50% Injectable 12.5 Gram(s) IV Push once  dextrose 50% Injectable 25 Gram(s) IV Push once  enoxaparin Injectable 30 milliGRAM(s) SubCutaneous daily  glucagon  Injectable 1 milliGRAM(s) IntraMuscular once  melatonin 3 milliGRAM(s) Oral at bedtime PRN  methylPREDNISolone sodium succinate Injectable 40 milliGRAM(s) IV Push daily  ondansetron Injectable 4 milliGRAM(s) IV Push every 8 hours PRN  polyethylene glycol 3350 17 Gram(s) Oral daily  senna 2 Tablet(s) Oral at bedtime     PT/INR - ( 2021 09:18 )   PT: 13.0 sec;   INR: 1.13 ratio         PTT - ( 2021 09:18 )  PTT:27.2 sec  ABG - ( 2021 12:53 )  pH, Arterial: 7.650 pH, Blood: x     /  pCO2: 31    /  pO2: 123   / HCO3: 34    / Base Excess: 13.4  /  SaO2: 99.5              CARDIAC MARKERS ( 2021 09:18 )  x     / 0.05 ng/mL / 62 U/L / x     / x            Urinalysis Basic - ( 2021 19:22 )    Color: Yellow / Appearance: Slightly Turbid / S.020 / pH: x  Gluc: x / Ketone: Negative  / Bili: Negative / Urobili: Negative mg/dL   Blood: x / Protein: 15 mg/dL / Nitrite: Negative   Leuk Esterase: Negative / RBC: 0-2 /HPF / WBC 0-2   Sq Epi: x / Non Sq Epi: Few / Bacteria: Few        IMAGING:   ACUTE CARE SURGERY CONSULT    Time Consult Called: 7:00 PM  Time Consult Seen: 7:25 PM    HPI:  89 y/o F w/ PMH of advanced dementia, HTN was BIBA from respiratory distress.  Pt unable to provide history at this time and was obtained from daughter.  Pt lives with daughter who noted her to have increased work of breathing for the past few days.  She used an inhaler that provided no relief.  Today daughter noted pt to be breathing very fast and shallow and was less responsive.  Daughter denies pt being febrile or having cough.  She has had no sick contacts that she knows of.   Daughter also reports that pt slid out of bed yesterday falling to the ground slowly and but had no LOC or head trauma.    (2021 16:12)    The patient was admitted for an acute episode of COPD, and managed with non-invasive positive pressure ventilation. As per daughter, patient has not had a bowel movement for the last 3-4 days. In addition, patient is having trouble urinating, and there was concern that the urinary retention was due to the constipation. An abdominal x-ray was concerning for ileus vs constipation. General surgery was consulted for further management.     PAST MEDICAL HISTORY:  Alzheimer disease    Congestive heart failure (CHF)    Chronic obstructive pulmonary disease (COPD)        PAST SURGICAL HISTORY:  No significant past surgical history    No significant past surgical history        ALLERGIES:  american cockroaches (Rash)  No Known Drug Allergies  Silk (Unknown)      FAMILY HISTORY: Noncontributory    SOCIAL HISTORY: Denies tobacco, EtOH, illicit substance use.     HOME MEDICATIONS:    MEDICATIONS  (STANDING):  aspirin enteric coated 81 milliGRAM(s) Oral daily  azithromycin  IVPB 500 milliGRAM(s) IV Intermittent every 24 hours  bisacodyl Suppository 10 milliGRAM(s) Rectal once  dextrose 40% Gel 15 Gram(s) Oral once  dextrose 5%. 1000 milliLiter(s) (50 mL/Hr) IV Continuous <Continuous>  dextrose 5%. 1000 milliLiter(s) (100 mL/Hr) IV Continuous <Continuous>  dextrose 50% Injectable 25 Gram(s) IV Push once  dextrose 50% Injectable 12.5 Gram(s) IV Push once  dextrose 50% Injectable 25 Gram(s) IV Push once  enoxaparin Injectable 30 milliGRAM(s) SubCutaneous daily  glucagon  Injectable 1 milliGRAM(s) IntraMuscular once  methylPREDNISolone sodium succinate Injectable 40 milliGRAM(s) IV Push daily  polyethylene glycol 3350 17 Gram(s) Oral daily  senna 2 Tablet(s) Oral at bedtime    MEDICATIONS  (PRN):  acetaminophen     Tablet .. 650 milliGRAM(s) Oral every 6 hours PRN Temp greater or equal to 38C (100.4F), Mild Pain (1 - 3)  aluminum hydroxide/magnesium hydroxide/simethicone Suspension 30 milliLiter(s) Oral every 4 hours PRN Dyspepsia  melatonin 3 milliGRAM(s) Oral at bedtime PRN Insomnia  ondansetron Injectable 4 milliGRAM(s) IV Push every 8 hours PRN Nausea and/or Vomiting      VITALS & I/Os:  Vital Signs Last 24 Hrs  T(C): 36.7 (2021 20:00), Max: 37.1 (2021 04:00)  T(F): 98 (2021 20:00), Max: 98.8 (2021 04:00)  HR: 87 (2021 20:00) (51 - 141)  BP: 135/76 (2021 20:00) (100/55 - 146/78)  BP(mean): 96 (2021 20:00) (71 - 96)  RR: 18 (2021 20:00) (17 - 34)  SpO2: 100% (2021 20:00) (96% - 100%)  CAPILLARY BLOOD GLUCOSE      POCT Blood Glucose.: 104 mg/dL (2021 12:21)  POCT Blood Glucose.: 124 mg/dL (2021 04:02)      I&O's Summary    2021 07:01  -  2021 07:00  --------------------------------------------------------  IN: 0 mL / OUT: 500 mL / NET: -500 mL    2021 07:01  -  2021 21:17  --------------------------------------------------------  IN: 0 mL / OUT: 100 mL / NET: -100 mL        GENERAL: Alert, well developed, in no acute distress.  RESPIRATORY: Nonlabored on RA  CARDIOVASCULAR: RRR.   GASTROINTESTINAL: Abdomen soft, mild suprapubic tenderness  MUSCULOSKELETAL: No cyanosis or clubbing. No gross deformities.     LABS:                        11.7   4.68  )-----------( 183      ( 2021 05:14 )             38.4         144  |  96<L>  |  31.5<H>  ----------------------------<  125<H>  4.5   |  35.0<H>  |  1.07    Ca    8.9      2021 05:14  Phos  2.9       Mg     2.0         TPro  6.5<L>  /  Alb  3.6  /  TBili  0.6  /  DBili  x   /  AST  16  /  ALT  8   /  AlkPhos  73      Lactate: acetaminophen     Tablet .. 650 milliGRAM(s) Oral every 6 hours PRN  aluminum hydroxide/magnesium hydroxide/simethicone Suspension 30 milliLiter(s) Oral every 4 hours PRN  aspirin enteric coated 81 milliGRAM(s) Oral daily  azithromycin  IVPB 500 milliGRAM(s) IV Intermittent every 24 hours  bisacodyl Suppository 10 milliGRAM(s) Rectal once  dextrose 40% Gel 15 Gram(s) Oral once  dextrose 5%. 1000 milliLiter(s) IV Continuous <Continuous>  dextrose 5%. 1000 milliLiter(s) IV Continuous <Continuous>  dextrose 50% Injectable 25 Gram(s) IV Push once  dextrose 50% Injectable 12.5 Gram(s) IV Push once  dextrose 50% Injectable 25 Gram(s) IV Push once  enoxaparin Injectable 30 milliGRAM(s) SubCutaneous daily  glucagon  Injectable 1 milliGRAM(s) IntraMuscular once  melatonin 3 milliGRAM(s) Oral at bedtime PRN  methylPREDNISolone sodium succinate Injectable 40 milliGRAM(s) IV Push daily  ondansetron Injectable 4 milliGRAM(s) IV Push every 8 hours PRN  polyethylene glycol 3350 17 Gram(s) Oral daily  senna 2 Tablet(s) Oral at bedtime     PT/INR - ( 2021 09:18 )   PT: 13.0 sec;   INR: 1.13 ratio         PTT - ( 2021 09:18 )  PTT:27.2 sec  ABG - ( 2021 12:53 )  pH, Arterial: 7.650 pH, Blood: x     /  pCO2: 31    /  pO2: 123   / HCO3: 34    / Base Excess: 13.4  /  SaO2: 99.5              CARDIAC MARKERS ( 2021 09:18 )  x     / 0.05 ng/mL / 62 U/L / x     / x            Urinalysis Basic - ( 2021 19:22 )    Color: Yellow / Appearance: Slightly Turbid / S.020 / pH: x  Gluc: x / Ketone: Negative  / Bili: Negative / Urobili: Negative mg/dL   Blood: x / Protein: 15 mg/dL / Nitrite: Negative   Leuk Esterase: Negative / RBC: 0-2 /HPF / WBC 0-2   Sq Epi: x / Non Sq Epi: Few / Bacteria: Few        IMAGING:

## 2021-11-19 NOTE — CONSULT NOTE ADULT - ATTENDING COMMENTS
seen and examined 11-19-21 @ 9787    admitted for COPD exacerbation  was on NIPPV, but currently on nasal cannula    soft / NT / mildly distended / tympanitic  no surgical scars on abdomen  no ventral hernias or groin hernias  normoactive bowel sounds  LARISA - refused by her daughter earlier    AXR - dilated stomach, small bowel and large bowel  CT pelvis w/o contrast (11/18 @ 1049) - small stool burden in rectum, but no significant constipation      gaseous abdominal distension is most likely secondary to NIPPV  no evidence of SBO, and this would be very rare in the absence of abdominal surgery or hernias.  normoactive bowel sounds rules out ileus  -routine bowel regimen given her history of constipation  -reconsult surgery PRN seen and examined 11-19-21 @ 1340    admitted for COPD exacerbation  was on NIPPV, but currently on nasal cannula    soft / NT / mildly distended / tympanitic  no surgical scars on abdomen  no ventral hernias or groin hernias  normoactive bowel sounds  LARISA - refused by her daughter earlier      labs - high HCO3 (most likely secondary to chronic respiratory acidosis), but otherwise unremarkable      AXR - dilated stomach, small bowel and large bowel  CT pelvis w/o contrast (11/18 @ 1049) - small stool burden in rectum, but no significant constipation      gaseous abdominal distension is most likely secondary to NIPPV  no evidence of SBO, and this would be very rare in the absence of abdominal surgery or hernias.  normoactive bowel sounds rules out ileus  -routine bowel regimen given her history of constipation  -reconsult surgery PRN

## 2021-11-19 NOTE — CONSULT NOTE ADULT - ASSESSMENT
89 y/o F w/ PMH of advanced dementia, HTN was BIBA from respiratory distress with general surgery being consulted for ileus vs small obstruction. The patient's distention is most likely due to non-invasive positive pressure ventilation. There is no evidence of an SBO.     - Patient should get a bowel regimen given her history of constipation  - Bladder scan q6h as per primary team to assess for urinary retention  - Please consult surgery was needed    Discussed with Dr. Ba

## 2021-11-19 NOTE — CHART NOTE - NSCHARTNOTEFT_GEN_A_CORE
Patient admitted with acute hypoxic hypercapnic respiratory failure possibly 2/2 COPD exacerbation now with urinary retention 2/2 to constipation   CT ab/ pelvis w/ oral cont and IV cont ordered by day time for concern of bowel obstruction   AXR performed today with dilated stomach, small bowel and large bowel  CT pelvis w/o contrast (11/18 @ 1049) - small stool burden in rectum, but no significant constipation  Surgery consulted - very low suspicion for bowel obstruction and ileus based on physical findings and recommends continuation of bowel regimen  Likely gaseous distention 2/2 to AVAPS vs constipation  Most recent bladder scan with approx 100cc   Patient unable to tolerate oral contrast on this time   Will order CT ab/pelvis w/ IV contrast urgently    Patient to be placed on AVAP overnight as patient is not receiving oral contrast  RN to notify with any acute changes Patient admitted with acute hypoxic hypercapnic respiratory failure possibly 2/2 COPD exacerbation now with urinary retention 2/2 to constipation   CT ab/ pelvis w/ oral cont and IV cont ordered by day time for concern of bowel obstruction   AXR performed today with dilated stomach, small bowel and large bowel  CT pelvis w/o contrast (11/18 @ 1049) - small stool burden in rectum, but no significant constipation  Surgery consulted - very low suspicion for bowel obstruction and ileus based on physical findings and recommends continuation of bowel regimen  Likely gaseous distention 2/2 to AVAPS vs constipation  Most recent bladder scan with approx 100cc   Patient unable to tolerate oral contrast at this time   Will order CT ab/pelvis w/ IV contrast urgently    Patient to be placed on AVAP overnight as patient is not receiving oral contrast  RN to notify with any acute changes

## 2021-11-20 LAB
ANION GAP SERPL CALC-SCNC: 12 MMOL/L — SIGNIFICANT CHANGE UP (ref 5–17)
BASOPHILS # BLD AUTO: 0.02 K/UL — SIGNIFICANT CHANGE UP (ref 0–0.2)
BASOPHILS NFR BLD AUTO: 0.2 % — SIGNIFICANT CHANGE UP (ref 0–2)
BUN SERPL-MCNC: 45.3 MG/DL — HIGH (ref 8–20)
CALCIUM SERPL-MCNC: 8.4 MG/DL — LOW (ref 8.6–10.2)
CHLORIDE SERPL-SCNC: 98 MMOL/L — SIGNIFICANT CHANGE UP (ref 98–107)
CO2 SERPL-SCNC: 31 MMOL/L — HIGH (ref 22–29)
CREAT SERPL-MCNC: 1.25 MG/DL — SIGNIFICANT CHANGE UP (ref 0.5–1.3)
EOSINOPHIL # BLD AUTO: 0.01 K/UL — SIGNIFICANT CHANGE UP (ref 0–0.5)
EOSINOPHIL NFR BLD AUTO: 0.1 % — SIGNIFICANT CHANGE UP (ref 0–6)
GLUCOSE SERPL-MCNC: 100 MG/DL — HIGH (ref 70–99)
HCT VFR BLD CALC: 41.5 % — SIGNIFICANT CHANGE UP (ref 34.5–45)
HGB BLD-MCNC: 12.6 G/DL — SIGNIFICANT CHANGE UP (ref 11.5–15.5)
IMM GRANULOCYTES NFR BLD AUTO: 0.3 % — SIGNIFICANT CHANGE UP (ref 0–1.5)
LYMPHOCYTES # BLD AUTO: 0.95 K/UL — LOW (ref 1–3.3)
LYMPHOCYTES # BLD AUTO: 10.1 % — LOW (ref 13–44)
MAGNESIUM SERPL-MCNC: 2.2 MG/DL — SIGNIFICANT CHANGE UP (ref 1.6–2.6)
MCHC RBC-ENTMCNC: 28.1 PG — SIGNIFICANT CHANGE UP (ref 27–34)
MCHC RBC-ENTMCNC: 30.4 GM/DL — LOW (ref 32–36)
MCV RBC AUTO: 92.4 FL — SIGNIFICANT CHANGE UP (ref 80–100)
MONOCYTES # BLD AUTO: 0.62 K/UL — SIGNIFICANT CHANGE UP (ref 0–0.9)
MONOCYTES NFR BLD AUTO: 6.6 % — SIGNIFICANT CHANGE UP (ref 2–14)
NEUTROPHILS # BLD AUTO: 7.82 K/UL — HIGH (ref 1.8–7.4)
NEUTROPHILS NFR BLD AUTO: 82.7 % — HIGH (ref 43–77)
PHOSPHATE SERPL-MCNC: 4 MG/DL — SIGNIFICANT CHANGE UP (ref 2.4–4.7)
PLATELET # BLD AUTO: 201 K/UL — SIGNIFICANT CHANGE UP (ref 150–400)
POTASSIUM SERPL-MCNC: 4.4 MMOL/L — SIGNIFICANT CHANGE UP (ref 3.5–5.3)
POTASSIUM SERPL-SCNC: 4.4 MMOL/L — SIGNIFICANT CHANGE UP (ref 3.5–5.3)
RBC # BLD: 4.49 M/UL — SIGNIFICANT CHANGE UP (ref 3.8–5.2)
RBC # FLD: 13.9 % — SIGNIFICANT CHANGE UP (ref 10.3–14.5)
SODIUM SERPL-SCNC: 141 MMOL/L — SIGNIFICANT CHANGE UP (ref 135–145)
WBC # BLD: 9.45 K/UL — SIGNIFICANT CHANGE UP (ref 3.8–10.5)
WBC # FLD AUTO: 9.45 K/UL — SIGNIFICANT CHANGE UP (ref 3.8–10.5)

## 2021-11-20 PROCEDURE — 99233 SBSQ HOSP IP/OBS HIGH 50: CPT

## 2021-11-20 PROCEDURE — 74177 CT ABD & PELVIS W/CONTRAST: CPT | Mod: 26

## 2021-11-20 RX ORDER — METOPROLOL TARTRATE 50 MG
50 TABLET ORAL
Refills: 0 | Status: DISCONTINUED | OUTPATIENT
Start: 2021-11-20 | End: 2021-11-21

## 2021-11-20 RX ADMIN — Medication 81 MILLIGRAM(S): at 11:50

## 2021-11-20 RX ADMIN — Medication 10 MILLIGRAM(S): at 11:51

## 2021-11-20 RX ADMIN — SENNA PLUS 2 TABLET(S): 8.6 TABLET ORAL at 22:07

## 2021-11-20 RX ADMIN — ENOXAPARIN SODIUM 30 MILLIGRAM(S): 100 INJECTION SUBCUTANEOUS at 11:50

## 2021-11-20 RX ADMIN — POLYETHYLENE GLYCOL 3350 17 GRAM(S): 17 POWDER, FOR SOLUTION ORAL at 11:52

## 2021-11-20 RX ADMIN — Medication 50 MILLIGRAM(S): at 17:30

## 2021-11-20 RX ADMIN — Medication 40 MILLIGRAM(S): at 05:17

## 2021-11-20 RX ADMIN — Medication 50 MILLIGRAM(S): at 13:53

## 2021-11-20 RX ADMIN — Medication 40 MILLIGRAM(S): at 11:59

## 2021-11-20 RX ADMIN — AZITHROMYCIN 255 MILLIGRAM(S): 500 TABLET, FILM COATED ORAL at 18:36

## 2021-11-20 RX ADMIN — Medication 25 MILLIGRAM(S): at 05:17

## 2021-11-20 NOTE — PROGRESS NOTE ADULT - ASSESSMENT
89 y/o F w/ PMH of advanced dementia, HTN, COPD was BIBA from respiratory distress.  Pt unable to provide history at this time and was obtained from daughter.  Pt lives with daughter who noted her to have increased work of breathing for the past few days. Daughter also reports that pt slid out of bed yesterday falling to the ground slowly and but had no LOC or head trauma.  On arrival to the ED pt noted to be hypoxic w/ O2 sat 86 ORA w/ increased wob and placed on supplemental O2.  Pt became more lethargic and ABG was performed which showed PaCO2 86 and patient was placed on NIV in the form of AVAPS.        1. Acute hypoxic hypercapnic respiratory failure possibly 2/2 COPD exacerbation - improving   Taper O2 and check SpO2   Taper Solumedrol to Prednisone 40 mg po daily   c/w azithromycin     -  Chest PT as tolerated    c/w  AVAPS nightly  Get out of bed to chair and PT evaluation       2. Acute metabolic encephalopathy 2/2 CO2 narcosis and urinary retention - improved     3. Urinary retention suspect 2.2 to constipation x 3-4 days as per daughter at bedside   Miralax/Senna   Dulcolax suppository  bladder scan q6h with straight cath as needed    4. HTN - BP controlled   - Metoprolol 25mg BID    5. Advanced dementia   - On Namzeric but non formulary   Daughter will bring the medications in       VTE ppx: lovenox SQ    Code Status: DNR/DNI  Dispo: Remain acute pending improvement in oxygenation.     89 y/o F w/ PMH of advanced dementia, HTN, COPD was BIBA from respiratory distress.  Pt unable to provide history at this time and was obtained from daughter.  Pt lives with daughter who noted her to have increased work of breathing for the past few days. Daughter also reports that pt slid out of bed yesterday falling to the ground slowly and but had no LOC or head trauma.  On arrival to the ED pt noted to be hypoxic w/ O2 sat 86 ORA w/ increased wob and placed on supplemental O2.  Pt became more lethargic and ABG was performed which showed PaCO2 86 and patient was placed on NIV in the form of AVAPS.        1. Acute hypoxic hypercapnic respiratory failure possibly 2/2 COPD exacerbation - improving   Taper O2 and check SpO2   Taper Solumedrol to Prednisone 40 mg po daily   c/w azithromycin     -  Chest PT as tolerated    c/w  AVAPS nightly  Get out of bed to chair and PT evaluation       2. Acute metabolic encephalopathy 2/2 CO2 narcosis and urinary retention - improved     3. Urinary retention suspect 2.2 to constipation x 3-4 days as per daughter at bedside   Miralax/Senna   Dulcolax suppository  bladder scan q6h with straight cath as needed    4. HTN - BP controlled   - Metoprolol 25mg BID    5. Advanced dementia   - On Namzeric but non formulary , ordered placed  Daughter brought the meds       VTE ppx: lovenox SQ    Code Status: DNR/DNI  Dispo: Remain acute pending improvement in oxygenation.

## 2021-11-20 NOTE — PROGRESS NOTE ADULT - SUBJECTIVE AND OBJECTIVE BOX
Patient is a 89y old  Female who presents with a chief complaint of acute respiratory failure (2021 21:16)      INTERVAL HPI/OVERNIGHT EVENTS: seen and examined. Daughter at bedside. Patient is alert and following commands. No complains   She is on 2 Lt of O2 and saturating 96-99 %.  Per daughter, pt was able to walk at home        MEDICATIONS  (STANDING):  aspirin enteric coated 81 milliGRAM(s) Oral daily  azithromycin  IVPB 500 milliGRAM(s) IV Intermittent every 24 hours  bisacodyl Suppository 10 milliGRAM(s) Rectal once  dextrose 40% Gel 15 Gram(s) Oral once  dextrose 5%. 1000 milliLiter(s) (50 mL/Hr) IV Continuous <Continuous>  dextrose 5%. 1000 milliLiter(s) (100 mL/Hr) IV Continuous <Continuous>  dextrose 50% Injectable 25 Gram(s) IV Push once  dextrose 50% Injectable 12.5 Gram(s) IV Push once  dextrose 50% Injectable 25 Gram(s) IV Push once  enoxaparin Injectable 30 milliGRAM(s) SubCutaneous daily  glucagon  Injectable 1 milliGRAM(s) IntraMuscular once  methylPREDNISolone sodium succinate Injectable 40 milliGRAM(s) IV Push daily  metoprolol tartrate 25 milliGRAM(s) Oral two times a day  polyethylene glycol 3350 17 Gram(s) Oral daily  senna 2 Tablet(s) Oral at bedtime    MEDICATIONS  (PRN):  acetaminophen     Tablet .. 650 milliGRAM(s) Oral every 6 hours PRN Temp greater or equal to 38C (100.4F), Mild Pain (1 - 3)  aluminum hydroxide/magnesium hydroxide/simethicone Suspension 30 milliLiter(s) Oral every 4 hours PRN Dyspepsia  melatonin 3 milliGRAM(s) Oral at bedtime PRN Insomnia  ondansetron Injectable 4 milliGRAM(s) IV Push every 8 hours PRN Nausea and/or Vomiting      Allergies    american cockroaches (Rash)  No Known Drug Allergies  Silk (Unknown)    Intolerances        REVIEW OF SYSTEMS:  CONSTITUTIONAL: No fever, weight loss, or fatigue  RESPIRATORY: No cough, wheezing, chills or hemoptysis; No shortness of breath  CARDIOVASCULAR: No chest pain, palpitations, dizziness, or leg swelling  GASTROINTESTINAL: No abdominal or epigastric pain. No nausea, vomiting, or hematemesis; No diarrhea or constipation. No melena or hematochezia.  NEUROLOGICAL: No headaches, memory loss, loss of strength, numbness, or tremors  MUSCULOSKELETAL: No joint pain or swelling; No muscle, back, or extremity pain      Vital Signs Last 24 Hrs  T(C): 36.7 (2021 08:00), Max: 36.8 (2021 00:00)  T(F): 98 (2021 08:00), Max: 98.2 (2021 00:00)  HR: 64 (2021 08:00) (64 - 141)  BP: 110/74 (2021 08:00) (101/75 - 146/78)  BP(mean): 84 (2021 08:00) (83 - 96)  RR: 17 (2021 08:00) (16 - 25)  SpO2: 98% (2021 08:00) (98% - 100%)    PHYSICAL EXAM:  GENERAL: Fragile elderly lady, sitting on the bed and eating     HEAD:  temporal wasting   EYES: EOMI, PERRLA, conjunctiva and sclera clear  NECK: Supple, No JVD, Normal thyroid  NERVOUS SYSTEM:  Alert & Oriented X1, No gross focal deficits  CHEST/LUNG: Clear to percussion bilaterally; No rales, rhonchi, wheezing, or rubs  HEART: Regular rate and rhythm; No murmurs, rubs, or gallops  ABDOMEN: abdomen is distended, soft, Bowel sounds present  EXTREMITIES:  No clubbing, cyanosis, or edema  SKIN: No rashes or lesions    LABS:                        12.6   9.45  )-----------( 201      ( 2021 06:19 )             41.5     11-20    141  |  98  |  45.3<H>  ----------------------------<  100<H>  4.4   |  31.0<H>  |  1.25    Ca    8.4<L>      2021 06:19  Phos  4.0     11-20  Mg     2.2     -20    TPro  6.5<L>  /  Alb  3.6  /  TBili  0.6  /  DBili  x   /  AST  16  /  ALT  8   /  AlkPhos  73  11-19      Urinalysis Basic - ( 2021 19:22 )    Color: Yellow / Appearance: Slightly Turbid / S.020 / pH: x  Gluc: x / Ketone: Negative  / Bili: Negative / Urobili: Negative mg/dL   Blood: x / Protein: 15 mg/dL / Nitrite: Negative   Leuk Esterase: Negative / RBC: 0-2 /HPF / WBC 0-2   Sq Epi: x / Non Sq Epi: Few / Bacteria: Few      CAPILLARY BLOOD GLUCOSE      POCT Blood Glucose.: 104 mg/dL (2021 12:21)      RADIOLOGY & ADDITIONAL TESTS:    Imaging Personally Reviewed:  [ ] YES  [ ] NO    Consultant(s) Notes Reviewed:  [ ] YES  [ ] NO    Care Discussed with Consultants/Other Providers [ ] YES  [ ] NO    Plan of Care discussed with [facility]est[facility] [ ]YES [ ] NO

## 2021-11-21 PROCEDURE — 99233 SBSQ HOSP IP/OBS HIGH 50: CPT

## 2021-11-21 RX ORDER — METOPROLOL TARTRATE 50 MG
25 TABLET ORAL
Refills: 0 | Status: DISCONTINUED | OUTPATIENT
Start: 2021-11-21 | End: 2021-11-23

## 2021-11-21 RX ADMIN — POLYETHYLENE GLYCOL 3350 17 GRAM(S): 17 POWDER, FOR SOLUTION ORAL at 14:30

## 2021-11-21 RX ADMIN — Medication 3 MILLIGRAM(S): at 19:55

## 2021-11-21 RX ADMIN — AZITHROMYCIN 255 MILLIGRAM(S): 500 TABLET, FILM COATED ORAL at 18:00

## 2021-11-21 RX ADMIN — ENOXAPARIN SODIUM 30 MILLIGRAM(S): 100 INJECTION SUBCUTANEOUS at 12:30

## 2021-11-21 RX ADMIN — Medication 81 MILLIGRAM(S): at 12:19

## 2021-11-21 RX ADMIN — SENNA PLUS 2 TABLET(S): 8.6 TABLET ORAL at 19:55

## 2021-11-21 RX ADMIN — Medication 25 MILLIGRAM(S): at 17:42

## 2021-11-21 NOTE — CHART NOTE - NSCHARTNOTEFT_GEN_A_CORE
Provider contacted by RN due to HR 39, but mostly sustaining in the high 40s-low 50s. Pt currently sleeping in bed. As per RN, pt treated for persistent tachycardia earlier in the day w/ multiple doses lopressor. Rest VSS.     /65 T(C): 36.6 (11-21-21 @ 00:00), Max: 37.2 (11-20-21 @ 10:04)  T(F): 97.8 (11-21-21 @ 00:00), Max: 98.9 (11-20-21 @ 10:04)  HR: 56 (11-21-21 @ 00:57) (56 - 162)  BP: 113/65 (11-21-21 @ 00:57) (89/50 - 123/76)  RR: 17 (11-21-21 @ 00:57) (17 - 19)  SpO2: 100% (11-21-21 @ 00:57) (91% - 100%)      RN to recall provider w/ any acute changes

## 2021-11-21 NOTE — PROGRESS NOTE ADULT - SUBJECTIVE AND OBJECTIVE BOX
Patient is a 89y old  Female who presents with a chief complaint of acute respiratory failure (20 Nov 2021 11:11)      INTERVAL HPI/OVERNIGHT EVENTS: seen and examined. Daughter at bedside. No new complains. Back to her baseline.   Overnight noted to be bradycardiac   Had bowel movement     MEDICATIONS  (STANDING):  aspirin enteric coated 81 milliGRAM(s) Oral daily  azithromycin  IVPB 500 milliGRAM(s) IV Intermittent every 24 hours  dextrose 40% Gel 15 Gram(s) Oral once  dextrose 5%. 1000 milliLiter(s) (50 mL/Hr) IV Continuous <Continuous>  dextrose 5%. 1000 milliLiter(s) (100 mL/Hr) IV Continuous <Continuous>  dextrose 50% Injectable 25 Gram(s) IV Push once  dextrose 50% Injectable 12.5 Gram(s) IV Push once  dextrose 50% Injectable 25 Gram(s) IV Push once  enoxaparin Injectable 30 milliGRAM(s) SubCutaneous daily  glucagon  Injectable 1 milliGRAM(s) IntraMuscular once  metoprolol tartrate 25 milliGRAM(s) Oral two times a day  namzaric 28 mG/Dose 1 Capsule(s) Oral daily  polyethylene glycol 3350 17 Gram(s) Oral daily  predniSONE   Tablet 40 milliGRAM(s) Oral daily  senna 2 Tablet(s) Oral at bedtime    MEDICATIONS  (PRN):  acetaminophen     Tablet .. 650 milliGRAM(s) Oral every 6 hours PRN Temp greater or equal to 38C (100.4F), Mild Pain (1 - 3)  aluminum hydroxide/magnesium hydroxide/simethicone Suspension 30 milliLiter(s) Oral every 4 hours PRN Dyspepsia  melatonin 3 milliGRAM(s) Oral at bedtime PRN Insomnia  ondansetron Injectable 4 milliGRAM(s) IV Push every 8 hours PRN Nausea and/or Vomiting      Allergies    american cockroaches (Rash)  No Known Drug Allergies  Silk (Unknown)    Intolerances        REVIEW OF SYSTEMS:  CONSTITUTIONAL: No fever, weight loss, or fatigue  RESPIRATORY: No cough, wheezing, chills or hemoptysis; No shortness of breath  CARDIOVASCULAR: No chest pain, palpitations, dizziness, or leg swelling  GASTROINTESTINAL: No abdominal or epigastric pain. No nausea, vomiting, or hematemesis; No diarrhea or constipation. No melena or hematochezia.  NEUROLOGICAL: No headaches, memory loss, loss of strength, numbness, or tremors  MUSCULOSKELETAL: No joint pain or swelling; No muscle, back, or extremity pain      Vital Signs Last 24 Hrs  T(C): 36.3 (21 Nov 2021 08:01), Max: 36.6 (20 Nov 2021 20:00)  T(F): 97.4 (21 Nov 2021 08:01), Max: 97.9 (20 Nov 2021 20:00)  HR: 55 (21 Nov 2021 08:01) (55 - 162)  BP: 107/52 (21 Nov 2021 08:01) (89/50 - 123/76)  BP(mean): 70 (21 Nov 2021 08:01) (63 - 80)  RR: 17 (21 Nov 2021 08:01) (17 - 19)  SpO2: 100% (21 Nov 2021 08:01) (91% - 100%)    PHYSICAL EXAM:  GENERAL: Fragile, elderly lady, sitting on the bed, NAD,   HEAD:  temporal wasting   EYES: EOMI, PERRLA, conjunctiva and sclera clear  NECK: Supple, No JVD, Normal thyroid  NERVOUS SYSTEM:  Alert & Oriented X1, No gross focal deficits  CHEST/LUNG: Clear to percussion bilaterally; No rales, rhonchi, wheezing, or rubs  HEART: Regular rate and rhythm; No murmurs, rubs, or gallops  ABDOMEN: Soft, Nontender, Nondistended; Bowel sounds present  EXTREMITIES:  No clubbing, cyanosis, or edema  SKIN: No rashes or lesions    LABS:                        12.6   9.45  )-----------( 201      ( 20 Nov 2021 06:19 )             41.5     11-20    141  |  98  |  45.3<H>  ----------------------------<  100<H>  4.4   |  31.0<H>  |  1.25    Ca    8.4<L>      20 Nov 2021 06:19  Phos  4.0     11-20  Mg     2.2     11-20          CAPILLARY BLOOD GLUCOSE          RADIOLOGY & ADDITIONAL TESTS:    Imaging Personally Reviewed:  [ ] YES  [ ] NO    Consultant(s) Notes Reviewed:  [ ] YES  [ ] NO    Care Discussed with Consultants/Other Providers [ ] YES  [ ] NO    Plan of Care discussed with Housestaff [ ]YES [ ] NO

## 2021-11-21 NOTE — PHYSICAL THERAPY INITIAL EVALUATION ADULT - ADDITIONAL COMMENTS
Obtained social history from pt's daughter secondary to pt is A&Ox1 (baseline) and [place]. Daughter reports she lives with her mom and is her caretaker 24/7. Pt has 4 steps to enter with handrail and no stairs inside. Pt ambulates/negotiate steps with hand held assist from daughter as she does not like to use RW. Pt owns RW, cane, transport chair, a shower chair, and rollator which the brakes are broken on. Pt with no hx of falls within the last 6 months except for sliding out of bed. Requires assist with dressing & sponge bathing.

## 2021-11-21 NOTE — PROGRESS NOTE ADULT - ASSESSMENT
89 y/o F w/ PMH of advanced dementia, HTN, COPD was BIBA from respiratory distress.  Pt unable to provide history at this time and was obtained from daughter.  Pt lives with daughter who noted her to have increased work of breathing for the past few days. Daughter also reports that pt slid out of bed yesterday falling to the ground slowly and but had no LOC or head trauma.  On arrival to the ED pt noted to be hypoxic w/ O2 sat 86 ORA w/ increased wob and placed on supplemental O2.  Pt became more lethargic and ABG was performed which showed PaCO2 86 and patient was placed on NIV in the form of AVAPS.        1. Acute hypoxic hypercapnic respiratory failure possibly 2/2 COPD exacerbation - improving   Taper O2 and check SpO2   c/w  Prednisone 40 mg po daily   c/w azithromycin     -  Chest PT as tolerated    c/w  AVAPS nightly  Get out of bed to chair and PT evaluation       2. Acute metabolic encephalopathy 2/2 CO2 narcosis and urinary retention - improved     3. Urinary retention suspect 2.2 to constipation x 3-4 days as per daughter at bedside   Miralax/Senna   Dulcolax suppository  bladder scan q6h with straight cath as needed    4. HTN - BP controlled   - Metoprolol 25mg BID    5. Advanced dementia   - On Namzeric but non formulary , ordered placed  Daughter brought the meds       VTE ppx: lovenox SQ    Code Status: DNR/DNI  Dispo: Discharge likely on Tuesday. daughter is not able to take her tomorrow

## 2021-11-21 NOTE — PHYSICAL THERAPY INITIAL EVALUATION ADULT - GENERAL OBSERVATIONS, REHAB EVAL
Pt received in chair, + IV Loc, +Tele/, + NC O2 @ 2LPM, + daughter present, in NAD, in 0/10 pain, agreeable to PT evaluation

## 2021-11-21 NOTE — PHYSICAL THERAPY INITIAL EVALUATION ADULT - DISCHARGE DISPOSITION, PT EVAL
24/7 Assist from daughter whom is her caretaker and is willing & able to continue to provide the care./home w/ assist/home w/ home PT

## 2021-11-21 NOTE — PHYSICAL THERAPY INITIAL EVALUATION ADULT - PERTINENT HX OF CURRENT PROBLEM, REHAB EVAL
Per EMR: Pt lives with daughter who noted her to have increased work of breathing for the past few days. Daughter also reports that pt slid out of bed yesterday falling to the ground slowly and but had no LOC or head trauma.  On arrival to the ED pt noted to be hypoxic w/ O2 sat 86 ORA w/ increased wob and placed on supplemental O2

## 2021-11-22 DIAGNOSIS — I10 ESSENTIAL (PRIMARY) HYPERTENSION: ICD-10-CM

## 2021-11-22 DIAGNOSIS — I48.91 UNSPECIFIED ATRIAL FIBRILLATION: ICD-10-CM

## 2021-11-22 DIAGNOSIS — I50.9 HEART FAILURE, UNSPECIFIED: ICD-10-CM

## 2021-11-22 LAB
ANION GAP SERPL CALC-SCNC: 11 MMOL/L — SIGNIFICANT CHANGE UP (ref 5–17)
APTT BLD: 21.1 SEC — LOW (ref 27.5–35.5)
BUN SERPL-MCNC: 49.4 MG/DL — HIGH (ref 8–20)
CALCIUM SERPL-MCNC: 9 MG/DL — SIGNIFICANT CHANGE UP (ref 8.6–10.2)
CHLORIDE SERPL-SCNC: 95 MMOL/L — LOW (ref 98–107)
CO2 SERPL-SCNC: 31 MMOL/L — HIGH (ref 22–29)
CREAT SERPL-MCNC: 1.19 MG/DL — SIGNIFICANT CHANGE UP (ref 0.5–1.3)
GLUCOSE SERPL-MCNC: 102 MG/DL — HIGH (ref 70–99)
HCT VFR BLD CALC: 43.5 % — SIGNIFICANT CHANGE UP (ref 34.5–45)
HGB BLD-MCNC: 13 G/DL — SIGNIFICANT CHANGE UP (ref 11.5–15.5)
INR BLD: 0.98 RATIO — SIGNIFICANT CHANGE UP (ref 0.88–1.16)
MAGNESIUM SERPL-MCNC: 2.4 MG/DL — SIGNIFICANT CHANGE UP (ref 1.8–2.6)
MCHC RBC-ENTMCNC: 27.4 PG — SIGNIFICANT CHANGE UP (ref 27–34)
MCHC RBC-ENTMCNC: 29.9 GM/DL — LOW (ref 32–36)
MCV RBC AUTO: 91.8 FL — SIGNIFICANT CHANGE UP (ref 80–100)
NT-PROBNP SERPL-SCNC: 2058 PG/ML — HIGH (ref 0–300)
PHOSPHATE SERPL-MCNC: 2.5 MG/DL — SIGNIFICANT CHANGE UP (ref 2.4–4.7)
PLATELET # BLD AUTO: 202 K/UL — SIGNIFICANT CHANGE UP (ref 150–400)
POTASSIUM SERPL-MCNC: 5 MMOL/L — SIGNIFICANT CHANGE UP (ref 3.5–5.3)
POTASSIUM SERPL-SCNC: 5 MMOL/L — SIGNIFICANT CHANGE UP (ref 3.5–5.3)
PROTHROM AB SERPL-ACNC: 11.4 SEC — SIGNIFICANT CHANGE UP (ref 10.6–13.6)
RBC # BLD: 4.74 M/UL — SIGNIFICANT CHANGE UP (ref 3.8–5.2)
RBC # FLD: 13.6 % — SIGNIFICANT CHANGE UP (ref 10.3–14.5)
SODIUM SERPL-SCNC: 137 MMOL/L — SIGNIFICANT CHANGE UP (ref 135–145)
TROPONIN T SERPL-MCNC: 0.06 NG/ML — SIGNIFICANT CHANGE UP (ref 0–0.06)
WBC # BLD: 8.64 K/UL — SIGNIFICANT CHANGE UP (ref 3.8–10.5)
WBC # FLD AUTO: 8.64 K/UL — SIGNIFICANT CHANGE UP (ref 3.8–10.5)

## 2021-11-22 PROCEDURE — 99223 1ST HOSP IP/OBS HIGH 75: CPT

## 2021-11-22 PROCEDURE — 99232 SBSQ HOSP IP/OBS MODERATE 35: CPT

## 2021-11-22 PROCEDURE — 12345: CPT | Mod: NC

## 2021-11-22 PROCEDURE — 93010 ELECTROCARDIOGRAM REPORT: CPT

## 2021-11-22 PROCEDURE — 71045 X-RAY EXAM CHEST 1 VIEW: CPT | Mod: 26

## 2021-11-22 RX ORDER — DIGOXIN 250 MCG
500 TABLET ORAL ONCE
Refills: 0 | Status: DISCONTINUED | OUTPATIENT
Start: 2021-11-22 | End: 2021-11-22

## 2021-11-22 RX ORDER — METOPROLOL TARTRATE 50 MG
5 TABLET ORAL ONCE
Refills: 0 | Status: COMPLETED | OUTPATIENT
Start: 2021-11-22 | End: 2021-11-22

## 2021-11-22 RX ORDER — SODIUM CHLORIDE 9 MG/ML
500 INJECTION, SOLUTION INTRAVENOUS ONCE
Refills: 0 | Status: COMPLETED | OUTPATIENT
Start: 2021-11-22 | End: 2021-11-22

## 2021-11-22 RX ADMIN — POLYETHYLENE GLYCOL 3350 17 GRAM(S): 17 POWDER, FOR SOLUTION ORAL at 12:15

## 2021-11-22 RX ADMIN — Medication 81 MILLIGRAM(S): at 12:15

## 2021-11-22 RX ADMIN — Medication 40 MILLIGRAM(S): at 05:40

## 2021-11-22 RX ADMIN — SODIUM CHLORIDE 500 MILLILITER(S): 9 INJECTION, SOLUTION INTRAVENOUS at 02:03

## 2021-11-22 RX ADMIN — AZITHROMYCIN 255 MILLIGRAM(S): 500 TABLET, FILM COATED ORAL at 17:46

## 2021-11-22 RX ADMIN — Medication 25 MILLIGRAM(S): at 06:12

## 2021-11-22 RX ADMIN — ENOXAPARIN SODIUM 30 MILLIGRAM(S): 100 INJECTION SUBCUTANEOUS at 12:15

## 2021-11-22 RX ADMIN — SENNA PLUS 2 TABLET(S): 8.6 TABLET ORAL at 21:48

## 2021-11-22 RX ADMIN — Medication 5 MILLIGRAM(S): at 16:04

## 2021-11-22 NOTE — CONSULT NOTE ADULT - ASSESSMENT
89 y/o F w/ PMHx of advanced dementia, COPD and HTN was BIBA from respiratory distress.  Pt unable to provide history at this time and was obtained from daughter.  Pt lives with daughter who noted her to have increased work of breathing for the past few days.  She used an inhaler that provided no relief.  Today daughter noted pt to be breathing very fast and shallow and was less responsive.  Daughter denies pt being febrile or having cough.  She has had no sick contacts that she knows of.   Daughter also reports that pt slid out of bed yesterday falling to the ground slowly but had no LOC or head trauma.     Cardiologist: Unknown if pt follows a cardiologist              89 y/o F BIBA from respiratory distress.

## 2021-11-22 NOTE — PROGRESS NOTE ADULT - SUBJECTIVE AND OBJECTIVE BOX
Patient is a 89y old  Female who presents with a chief complaint of acute respiratory failure (22 Nov 2021 04:09)      INTERVAL HPI/OVERNIGHT EVENTS: seen and examined. She is awake and sitting on the bed. No complains.   Niece is at bedside. No overnight events     MEDICATIONS  (STANDING):  aspirin enteric coated 81 milliGRAM(s) Oral daily  azithromycin  IVPB 500 milliGRAM(s) IV Intermittent every 24 hours  dextrose 40% Gel 15 Gram(s) Oral once  dextrose 5%. 1000 milliLiter(s) (50 mL/Hr) IV Continuous <Continuous>  dextrose 5%. 1000 milliLiter(s) (100 mL/Hr) IV Continuous <Continuous>  dextrose 50% Injectable 25 Gram(s) IV Push once  dextrose 50% Injectable 12.5 Gram(s) IV Push once  dextrose 50% Injectable 25 Gram(s) IV Push once  enoxaparin Injectable 30 milliGRAM(s) SubCutaneous daily  glucagon  Injectable 1 milliGRAM(s) IntraMuscular once  metoprolol tartrate 25 milliGRAM(s) Oral two times a day  namzaric 28 mG/Dose 1 Capsule(s) Oral daily  polyethylene glycol 3350 17 Gram(s) Oral daily  predniSONE   Tablet 40 milliGRAM(s) Oral daily  senna 2 Tablet(s) Oral at bedtime    MEDICATIONS  (PRN):  acetaminophen     Tablet .. 650 milliGRAM(s) Oral every 6 hours PRN Temp greater or equal to 38C (100.4F), Mild Pain (1 - 3)  aluminum hydroxide/magnesium hydroxide/simethicone Suspension 30 milliLiter(s) Oral every 4 hours PRN Dyspepsia  melatonin 3 milliGRAM(s) Oral at bedtime PRN Insomnia  ondansetron Injectable 4 milliGRAM(s) IV Push every 8 hours PRN Nausea and/or Vomiting      Allergies    american cockroaches (Rash)  No Known Drug Allergies  Silk (Unknown)    Intolerances        REVIEW OF SYSTEMS:  CONSTITUTIONAL: No fever, weight loss, or fatigue  RESPIRATORY: No cough, wheezing, chills or hemoptysis; No shortness of breath  CARDIOVASCULAR: No chest pain, palpitations, dizziness, or leg swelling  GASTROINTESTINAL: No abdominal or epigastric pain. No nausea, vomiting, or hematemesis; No diarrhea or constipation. No melena or hematochezia.  NEUROLOGICAL: No headaches, memory loss, loss of strength, numbness, or tremors  MUSCULOSKELETAL: No joint pain or swelling; No muscle, back, or extremity pain      Vital Signs Last 24 Hrs  T(C): 36.3 (22 Nov 2021 08:00), Max: 37.1 (22 Nov 2021 04:41)  T(F): 97.3 (22 Nov 2021 08:00), Max: 98.7 (22 Nov 2021 04:41)  HR: 74 (22 Nov 2021 08:00) (74 - 125)  BP: 93/69 (22 Nov 2021 08:00) (82/58 - 130/66)  BP(mean): 78 (22 Nov 2021 08:00) (69 - 83)  RR: 16 (22 Nov 2021 08:00) (16 - 28)  SpO2: 97% (22 Nov 2021 08:00) (92% - 98%)    PHYSICAL EXAM:  GENERAL: NAD, fragile elderly lady, sitting on the bed   HEAD:  temporal wasting   EYES: EOMI, PERRLA, conjunctiva and sclera clear  NECK: Supple, No JVD, Normal thyroid  NERVOUS SYSTEM:  Alert & Oriented X1, No gross focal deficits  CHEST/LUNG: Clear to percussion bilaterally; No rales, rhonchi, wheezing, or rubs  HEART: Regular rate and rhythm; No murmurs, rubs, or gallops  ABDOMEN: Soft, Nontender, Nondistended; Bowel sounds present  EXTREMITIES:  No clubbing, cyanosis, or edema  SKIN: No rashes or lesions    LABS:                        13.0   8.64  )-----------( 202      ( 22 Nov 2021 02:07 )             43.5     11-22    137  |  95<L>  |  49.4<H>  ----------------------------<  102<H>  5.0   |  31.0<H>  |  1.19    Ca    9.0      22 Nov 2021 02:07  Phos  2.5     11-22  Mg     2.4     11-22      PT/INR - ( 22 Nov 2021 02:07 )   PT: 11.4 sec;   INR: 0.98 ratio         PTT - ( 22 Nov 2021 02:07 )  PTT:21.1 sec    CAPILLARY BLOOD GLUCOSE          RADIOLOGY & ADDITIONAL TESTS:    Imaging Personally Reviewed:  [ ] YES  [ ] NO    Consultant(s) Notes Reviewed:  [ ] YES  [ ] NO    Care Discussed with Consultants/Other Providers [ ] YES  [ ] NO    Plan of Care discussed with Housestaff [ ]YES [ ] NO

## 2021-11-22 NOTE — CONSULT NOTE ADULT - CONSULT REASON
Ileus vs constipation seen on AXR; mild abdominal tenderness in suprapubic region
New onset of A-fib

## 2021-11-22 NOTE — CONSULT NOTE ADULT - ATTENDING COMMENTS
new onset atrial fibrillation with rapid ventricular rate . now in sinus with intermittent paroxysm.   patient admitted with fall . currently frail and still a fall risk.   going [facility] home care.  need to speak to daughter regarding long term anticoagulation . bleeding risk..    Patient is elderly and with advance age higher risk of short term bleeding.   more over, the chadsvasc and has-bled may not apply to her due to her advance age and other factors not able to factor in the risk benefit.  ct beta-blocker .  change to Po diuretics  distended abdomen. gaseous distension. Bladder scan.   ambulate . Out of Bed to Chair incentive spiromter.

## 2021-11-22 NOTE — CHART NOTE - NSCHARTNOTEFT_GEN_A_CORE
RN called in regard to new onset afib on monitor. HR is sustaining in the low 100s, w/ a soft bp at 86/54. She was seen at bedside and in NAD. Unable to get full ROS due to cognition.    Vital Signs Last 24 Hrs  T(C): 36.7 (22 Nov 2021 00:27), Max: 36.9 (22 Nov 2021 00:00)  T(F): 98 (22 Nov 2021 00:27), Max: 98.5 (22 Nov 2021 00:00)  HR: 125 (22 Nov 2021 01:30) (55 - 125)  BP: 86/54 (22 Nov 2021 01:30) (86/54 - 111/61)  BP(mean): 82 (22 Nov 2021 00:27) (69 - 82)  RR: 21 (22 Nov 2021 01:30) (17 - 28)  SpO2: 94% (22 Nov 2021 01:30) (92% - 100%)      HEAD:  temporal wasting   NERVOUS SYSTEM:  Alert & Oriented X1, No gross focal deficits  CHEST/LUNG: +crackles in the RLL  HEART: Irregular rate and rhythm  EXTREMITIES:  No clubbing, cyanosis, or edema    EKG confirms afib  STAT bmp, mag, phos, k, bnp, coags, and trop pending  500mL LR x 60 mins  Will re-evaluate after fluids  Cardiology consult for the AM  RN to call if HR sustains > 130 RN called in regard to new onset afib on monitor. HR is sustaining in the low 100s, w/ a soft bp at 86/54. She was seen at bedside and in NAD. Unable to get full ROS due to cognition.    Vital Signs Last 24 Hrs  T(C): 36.7 (22 Nov 2021 00:27), Max: 36.9 (22 Nov 2021 00:00)  T(F): 98 (22 Nov 2021 00:27), Max: 98.5 (22 Nov 2021 00:00)  HR: 125 (22 Nov 2021 01:30) (55 - 125)  BP: 86/54 (22 Nov 2021 01:30) (86/54 - 111/61)  BP(mean): 82 (22 Nov 2021 00:27) (69 - 82)  RR: 21 (22 Nov 2021 01:30) (17 - 28)  SpO2: 94% (22 Nov 2021 01:30) (92% - 100%)      HEAD:  temporal wasting   NERVOUS SYSTEM:  Alert & Oriented X1, No gross focal deficits  CHEST/LUNG: +crackles in the RLL  HEART: Irregular rate and rhythm  EXTREMITIES:  No clubbing, cyanosis, or edema    EKG confirms afib  STAT bmp, mag, phos, k, bnp, coags, and trop pending  500mL LR x 60 mins  Will re-evaluate after fluids  Cardiology consult for the AM  RN to call if HR sustains > 130    Addendum 3:25  cbc, coags, and electrolytes wnl  neg trop  bnp 2058 RN called in regard to new onset afib on monitor. HR is sustaining in the low 100s, w/ a soft bp at 86/54. She was seen at bedside and in NAD. Unable to get full ROS due to cognition.    Vital Signs Last 24 Hrs  T(C): 36.7 (22 Nov 2021 00:27), Max: 36.9 (22 Nov 2021 00:00)  T(F): 98 (22 Nov 2021 00:27), Max: 98.5 (22 Nov 2021 00:00)  HR: 125 (22 Nov 2021 01:30) (55 - 125)  BP: 86/54 (22 Nov 2021 01:30) (86/54 - 111/61)  BP(mean): 82 (22 Nov 2021 00:27) (69 - 82)  RR: 21 (22 Nov 2021 01:30) (17 - 28)  SpO2: 94% (22 Nov 2021 01:30) (92% - 100%)      HEAD:  temporal wasting   NERVOUS SYSTEM:  Alert & Oriented X1, No gross focal deficits  CHEST/LUNG: +crackles in the RLL  HEART: Irregular rate and rhythm  EXTREMITIES:  No clubbing, cyanosis, or edema    EKG confirms afib w/ evidence of GREGOR  STAT bmp, mag, phos, k, bnp, coags, and trop pending  500mL LR x 60 mins  Will re-evaluate after fluids  Cardiology consult for the AM  RN to call if HR sustains > 130    Addendum 3:25  cbc, coags, and electrolytes wnl  neg trop  bnp 2058  bp 89/59 MAP 69 s/p 500mL LR    Addendum 6:06  Cardio consult suggested full heparin AC therapy. At this time we will not start AC therapy due to the patients age and fall risk  Digoxin ordered due to patient being unable to tolerate lopressor at this time  Xray and TTE pending  BNP ordered for the AM  RN to notify for any changes

## 2021-11-22 NOTE — CONSULT NOTE ADULT - PROBLEM SELECTOR RECOMMENDATION 3
Soft BP  Continue Metoprolol 25 mg BID with strict parameters  DASH diet  Monitor BP    Further recommendations base on above findings

## 2021-11-22 NOTE — CONSULT NOTE ADULT - PROBLEM SELECTOR RECOMMENDATION 2
R/O CHF. No Echo on records  Pt received 500 ml NSF bolus due to hypotension. p-BNP is elevated and on physical exam minimal crackles on R lung at base noted however no pitting edema or JVD noted   Repeat p-BNP in AM  Chest x-ray from 11/18/2021 showed No vascular congestion noted. Repeat chest xray  Echo in AM to access structural and functional status  Maintain K+>4 and mag >2  Continue BB with strict parameters  Daily weight monitor and strict I&Os  Fluid restrictions x now until CHF is ro

## 2021-11-22 NOTE — PROGRESS NOTE ADULT - ASSESSMENT
89 y/o F w/ PMH of advanced dementia, HTN, COPD was BIBA from respiratory distress.  Pt unable to provide history at this time and was obtained from daughter.  Pt lives with daughter who noted her to have increased work of breathing for the past few days. Daughter also reports that pt slid out of bed yesterday falling to the ground slowly and but had no LOC or head trauma.  On arrival to the ED pt noted to be hypoxic w/ O2 sat 86 ORA w/ increased wob and placed on supplemental O2.  Pt became more lethargic and ABG was performed which showed PaCO2 86 and patient was placed on NIV in the form of AVAPS.        1. Acute hypoxic hypercapnic respiratory failure possibly 2/2 COPD exacerbation - improving   Off supplemental O2   c/w  Prednisone 40 mg po daily   c/w azithromycin     -  Chest PT as tolerated    c/w  AVAPS nightly  Get out of bed to chair and PT evaluation -done   Plan for discharge home   Daughter is not able to pick her up today       2. Acute metabolic encephalopathy 2/2 CO2 narcosis and urinary retention - improved     3. Urinary retention suspect 2.2 to constipation x 3-4 days as per daughter at bedside   Miralax/Senna   Dulcolax suppository  bladder scan q6h with straight cath as needed    4. HTN - BP controlled   - Metoprolol 25mg BID    5. Advanced dementia   - On Namzeric but non formulary , ordered placed  Daughter brought the meds       VTE ppx: lovenox SQ    Code Status: DNR/DNI  Dispo: Discharge likely on Tuesday. daughter is not able to take her today

## 2021-11-22 NOTE — CONSULT NOTE ADULT - SUBJECTIVE AND OBJECTIVE BOX
[place] CARDIOLOGY-[facility] Faculty Practice                                                        Office: [address] [address]                                                       Telephone: [phone]. Fax:[phone]    CARDIOLOGY CONSULTATION NOTE                                                                                             History obtained by: Patient and medical record   obtained: No    HPI: 89 y/o F w/ PMHx of advanced dementia, COPD and HTN was BIBA from respiratory distress.  Pt unable to provide history at this time and was obtained from daughter.  Pt lives with daughter who noted her to have increased work of breathing for the past few days.  She used an inhaler that provided no relief.  Today daughter noted pt to be breathing very fast and shallow and was less responsive.  Daughter denies pt being febrile or having cough.  She has had no sick contacts that she knows of.   Daughter also reports that pt slid out of bed yesterday falling to the ground slowly but had no LOC or head trauma.     Cardiologist: Unknown if pt follows a cardiologist     REVIEW OF SYMPTOMS: Unable to perform due to advance dementia     Allergies  american cockroaches (Rash)  Silk (Unknown)  No Known Drug Allergies    CURRENT MEDICATIONS:  metoprolol tartrate 25 milliGRAM(s) Oral two times a day  polyethylene glycol 3350  senna  aspirin enteric coated  azithromycin  IVPB  dextrose 40% Gel  dextrose 5%.  dextrose 50% Injectable  glucagon  Injectable  namzaric 28 mG/Dose  predniSONE   Tablet    Home Medications:  metoprolol tartrate 25 milliGRAM(s) Oral two times a day    Past Medical History  Alzheimer disease  HTN  Chronic obstructive pulmonary disease (COPD)    Past Surgical History  No significant past surgical history    FAMILY HISTORY:  No pertinent family history in first degree relatives    Social History:  + former smoker. Denies alcohol or drugs use. Lives with daughter     Vital Signs Last 24 Hrs  T(C): 36.7 (22 Nov 2021 00:27), Max: 36.9 (22 Nov 2021 00:00)  T(F): 98 (22 Nov 2021 00:27), Max: 98.5 (22 Nov 2021 00:00)  HR: 125 (22 Nov 2021 01:30) (55 - 125)  BP: 86/54 (22 Nov 2021 01:30) (86/54 - 111/61)  BP(mean): 82 (22 Nov 2021 00:27) (69 - 82)  RR: 21 (22 Nov 2021 01:30) (17 - 28)  SpO2: 94% (22 Nov 2021 01:30) (92% - 100%)    PHYSICAL EXAM:  Constitutional: Comfortable . No acute distress.   HEENT: Atraumatic and normcephalic , neck is supple . no JVD.  PEERL   CNS: A&O x1 (self). No focal neurologic deficits.   Respiratory: + crackles R >L. No wheezing, rhonchi   Cardiovascular: + Irregular, Irregular. + murmur. S1S2. No rubs  Gastrointestinal: Soft non-tender and non distended . +Bowel sounds.   Extremities: No pitting  edema x 4 extremities  Psychiatric: Calm . no agitation.    Intake and output:   11-20 @ 07:01  -  11-21 @ 07:00  --------------------------------------------------------  IN: 600 mL / OUT: 0 mL / NET: 600 mL    11-21 @ 07:01  -  11-22 @ 04:12  --------------------------------------------------------  IN: 120 mL / OUT: 0 mL / NET: 120 mL    LABS:                        13.0   8.64  )-----------( 202      ( 22 Nov 2021 02:07 )             43.5     11-22    137  |  95<L>  |  49.4<H>  ----------------------------<  102<H>  5.0   |  31.0<H>  |  1.19    Ca    9.0      22 Nov 2021 02:07  Phos  2.5     11-22  Mg     2.4     11-22    CARDIAC MARKERS ( 22 Nov 2021 02:07 )  x     / 0.06 ng/mL / x     / x     / x        ;p-BNP=Serum Pro-Brain Natriuretic Peptide: 2058 pg/mL (11-22 @ 02:07)  Serum Pro-Brain Natriuretic Peptide: 1913 pg/mL (11-18 @ 09:18)    PT/INR - ( 22 Nov 2021 02:07 )   PT: 11.4 sec;   INR: 0.98 ratio    PTT - ( 22 Nov 2021 02:07 )  PTT:21.1 sec    EKG: A-fib    RADIOLOGY & ADDITIONAL STUDIES:    Xray Chest 1 View- PORTABLE-Urgent (Xray Chest 1 View- PORTABLE-Urgent .) (11.18.21 @ 10:00) >  FINDINGS:  The visualized lungs are clear of airspace consolidations or effusions.   No pneumothorax.  The  heart is enlarged in transverse diameter. No hilar mass.  Atherosclerotic calcified intimal wall plaques within nondilated thoracic aorta.  Visualized osseous structures are intact.  IMPRESSION:   No radiographic evidence of active chest disease.  < end of copied text >    CT Abdomen and Pelvis w/ IV Cont (11.20.21 @ 01:08) >  IMPRESSION: No evidence of acute inflammatory or obstructive process in the abdomen and pelvis.  < end of copied text >    Preliminary evaluation, please await official recommendations     Assessment and recommendations are final when note is signed by the attending.                                                                        [place] CARDIOLOGY-[facility] Faculty Practice                                                        Office: [address] [address]                                                       Telephone: [phone]. Fax:[phone]    CARDIOLOGY CONSULTATION NOTE                                                                                             History obtained by: Patient and medical record   obtained: No    HPI: 89 y/o F w/ PMHx of advanced dementia, COPD and HTN was BIBA from respiratory distress.  Pt unable to provide history at this time and was obtained from daughter.  Pt lives with daughter who noted her to have increased work of breathing for the past few days.  She used an inhaler that provided no relief.  Today daughter noted pt to be breathing very fast and shallow and was less responsive.  Daughter denies pt being febrile or having cough.  She has had no sick contacts that she knows of.   Daughter also reports that pt slid out of bed yesterday falling to the ground slowly but had no LOC or head trauma.     Cardiologist: Unknown if pt follows a cardiologist     REVIEW OF SYMPTOMS: Unable to perform due to advance dementia     Allergies  american cockroaches (Rash)  Silk (Unknown)  No Known Drug Allergies    CURRENT MEDICATIONS:  metoprolol tartrate 25 milliGRAM(s) Oral two times a day  polyethylene glycol 3350  senna  aspirin enteric coated  azithromycin  IVPB  dextrose 40% Gel  dextrose 5%.  dextrose 50% Injectable  glucagon  Injectable  namzaric 28 mG/Dose  predniSONE   Tablet    Home Medications:  metoprolol tartrate 25 milliGRAM(s) Oral two times a day    Past Medical History  Alzheimer disease  HTN  Chronic obstructive pulmonary disease (COPD)    Past Surgical History  No significant past surgical history    FAMILY HISTORY:  No pertinent family history in first degree relatives    Social History:  + former smoker. Denies alcohol or drugs use. Lives with daughter     Vital Signs Last 24 Hrs  T(C): 36.7 (22 Nov 2021 00:27), Max: 36.9 (22 Nov 2021 00:00)  T(F): 98 (22 Nov 2021 00:27), Max: 98.5 (22 Nov 2021 00:00)  HR: 125 (22 Nov 2021 01:30) (55 - 125)  BP: 86/54 (22 Nov 2021 01:30) (86/54 - 111/61)  BP(mean): 82 (22 Nov 2021 00:27) (69 - 82)  RR: 21 (22 Nov 2021 01:30) (17 - 28)  SpO2: 94% (22 Nov 2021 01:30) (92% - 100%)    PHYSICAL EXAM:  Constitutional: Comfortable . No acute distress.   HEENT: Atraumatic and normcephalic , neck is supple . no JVD.  PEERL   CNS: A&O x1 (self). No focal neurologic deficits.   Respiratory: + crackles R. No wheezing, rhonchi   Cardiovascular: + Irregular, Irregular. + murmur. S1S2. No rubs  Gastrointestinal: Soft non-tender and non distended . +Bowel sounds.   Extremities: No pitting  edema x 4 extremities  Psychiatric: Calm . no agitation.    Intake and output:   11-20 @ 07:01  -  11-21 @ 07:00  --------------------------------------------------------  IN: 600 mL / OUT: 0 mL / NET: 600 mL    11-21 @ 07:01  -  11-22 @ 04:12  --------------------------------------------------------  IN: 120 mL / OUT: 0 mL / NET: 120 mL    LABS:                        13.0   8.64  )-----------( 202      ( 22 Nov 2021 02:07 )             43.5     11-22    137  |  95<L>  |  49.4<H>  ----------------------------<  102<H>  5.0   |  31.0<H>  |  1.19    Ca    9.0      22 Nov 2021 02:07  Phos  2.5     11-22  Mg     2.4     11-22    CARDIAC MARKERS ( 22 Nov 2021 02:07 )  x     / 0.06 ng/mL / x     / x     / x        ;p-BNP=Serum Pro-Brain Natriuretic Peptide: 2058 pg/mL (11-22 @ 02:07)  Serum Pro-Brain Natriuretic Peptide: 1913 pg/mL (11-18 @ 09:18)    PT/INR - ( 22 Nov 2021 02:07 )   PT: 11.4 sec;   INR: 0.98 ratio    PTT - ( 22 Nov 2021 02:07 )  PTT:21.1 sec    EKG: A-fib    RADIOLOGY & ADDITIONAL STUDIES:    Xray Chest 1 View- PORTABLE-Urgent (Xray Chest 1 View- PORTABLE-Urgent .) (11.18.21 @ 10:00) >  FINDINGS:  The visualized lungs are clear of airspace consolidations or effusions.   No pneumothorax.  The  heart is enlarged in transverse diameter. No hilar mass.  Atherosclerotic calcified intimal wall plaques within nondilated thoracic aorta.  Visualized osseous structures are intact.  IMPRESSION:   No radiographic evidence of active chest disease.  < end of copied text >    CT Abdomen and Pelvis w/ IV Cont (11.20.21 @ 01:08) >  IMPRESSION: No evidence of acute inflammatory or obstructive process in the abdomen and pelvis.  < end of copied text >    Preliminary evaluation, please await official recommendations     Assessment and recommendations are final when note is signed by the attending.                                                                        [place] CARDIOLOGY-[facility] Faculty Practice                                                        Office: [address] [address]                                                       Telephone: [phone]. Fax:[phone]    CARDIOLOGY CONSULTATION NOTE                                                                                             History obtained by: Patient and medical record   obtained: No    HPI: 89 y/o F w/ PMHx of advanced dementia, COPD and HTN was BIBA from respiratory distress.  Pt unable to provide history at this time and was obtained from daughter.  Pt lives with daughter who noted her to have increased work of breathing for the past few days.  She used an inhaler that provided no relief.  Today daughter noted pt to be breathing very fast and shallow and was less responsive.  Daughter denies pt being febrile or having cough.  She has had no sick contacts that she knows of.   Daughter also reports that pt slid out of bed yesterday falling to the ground slowly but had no LOC or head trauma.     Cardiologist: Unknown if pt follows a cardiologist     REVIEW OF SYMPTOMS: Unable to perform due to advance dementia     Allergies  american cockroaches (Rash)  Silk (Unknown)  No Known Drug Allergies    CURRENT MEDICATIONS:  metoprolol tartrate 25 milliGRAM(s) Oral two times a day  polyethylene glycol 3350  senna  aspirin enteric coated  azithromycin  IVPB  dextrose 40% Gel  dextrose 5%.  dextrose 50% Injectable  glucagon  Injectable  namzaric 28 mG/Dose  predniSONE   Tablet    Home Medications:  metoprolol tartrate 25 milliGRAM(s) Oral two times a day    Past Medical History  Alzheimer disease  HTN  Chronic obstructive pulmonary disease (COPD)    Past Surgical History  No significant past surgical history    FAMILY HISTORY:  No pertinent family history in first degree relatives    Social History:  + former smoker. Denies alcohol or drugs use. Lives with daughter     Vital Signs Last 24 Hrs  T(C): 36.7 (22 Nov 2021 00:27), Max: 36.9 (22 Nov 2021 00:00)  T(F): 98 (22 Nov 2021 00:27), Max: 98.5 (22 Nov 2021 00:00)  HR: 125 (22 Nov 2021 01:30) (55 - 125)  BP: 86/54 (22 Nov 2021 01:30) (86/54 - 111/61)  BP(mean): 82 (22 Nov 2021 00:27) (69 - 82)  RR: 21 (22 Nov 2021 01:30) (17 - 28)  SpO2: 94% (22 Nov 2021 01:30) (92% - 100%)    PHYSICAL EXAM:  Constitutional: Comfortable . No acute distress.   HEENT: Atraumatic and normcephalic , neck is supple . no JVD.  PEERL   CNS: A&O x1 (self). No focal neurologic deficits.   Respiratory: + crackles R. No wheezing, rhonchi   Cardiovascular: + Irregular, Irregular. + murmur. S1S2. No rubs  Gastrointestinal: Soft non-tender and non distended . +Bowel sounds.   Extremities: No pitting  edema x 4 extremities  Psychiatric: Calm . no agitation.    Intake and output:   11-20 @ 07:01  -  11-21 @ 07:00  --------------------------------------------------------  IN: 600 mL / OUT: 0 mL / NET: 600 mL    11-21 @ 07:01  -  11-22 @ 04:12  --------------------------------------------------------  IN: 120 mL / OUT: 0 mL / NET: 120 mL    LABS:                        13.0   8.64  )-----------( 202      ( 22 Nov 2021 02:07 )             43.5     11-22    137  |  95<L>  |  49.4<H>  ----------------------------<  102<H>  5.0   |  31.0<H>  |  1.19    Ca    9.0      22 Nov 2021 02:07  Phos  2.5     11-22  Mg     2.4     11-22    CARDIAC MARKERS ( 22 Nov 2021 02:07 )  x     / 0.06 ng/mL / x     / x     / x        ;p-BNP=Serum Pro-Brain Natriuretic Peptide: 2058 pg/mL (11-22 @ 02:07)  Serum Pro-Brain Natriuretic Peptide: 1913 pg/mL (11-18 @ 09:18)    PT/INR - ( 22 Nov 2021 02:07 )   PT: 11.4 sec;   INR: 0.98 ratio    PTT - ( 22 Nov 2021 02:07 )  PTT:21.1 sec    EKG: A-fib with RVR    RADIOLOGY & ADDITIONAL STUDIES:    Xray Chest 1 View- PORTABLE-Urgent (Xray Chest 1 View- PORTABLE-Urgent .) (11.18.21 @ 10:00) >  FINDINGS:  The visualized lungs are clear of airspace consolidations or effusions.   No pneumothorax.  The  heart is enlarged in transverse diameter. No hilar mass.  Atherosclerotic calcified intimal wall plaques within nondilated thoracic aorta.  Visualized osseous structures are intact.  IMPRESSION:   No radiographic evidence of active chest disease.  < end of copied text >    CT Abdomen and Pelvis w/ IV Cont (11.20.21 @ 01:08) >  IMPRESSION: No evidence of acute inflammatory or obstructive process in the abdomen and pelvis.  < end of copied text >    Preliminary evaluation, please await official recommendations     Assessment and recommendations are final when note is signed by the attending.

## 2021-11-23 ENCOUNTER — TRANSCRIPTION ENCOUNTER (OUTPATIENT)
Age: 86
End: 2021-11-23

## 2021-11-23 VITALS
HEART RATE: 83 BPM | DIASTOLIC BLOOD PRESSURE: 88 MMHG | TEMPERATURE: 98 F | SYSTOLIC BLOOD PRESSURE: 143 MMHG | OXYGEN SATURATION: 92 % | RESPIRATION RATE: 18 BRPM

## 2021-11-23 LAB
CULTURE RESULTS: SIGNIFICANT CHANGE UP
CULTURE RESULTS: SIGNIFICANT CHANGE UP
NT-PROBNP SERPL-SCNC: 1217 PG/ML — HIGH (ref 0–300)
SPECIMEN SOURCE: SIGNIFICANT CHANGE UP
SPECIMEN SOURCE: SIGNIFICANT CHANGE UP

## 2021-11-23 PROCEDURE — 36415 COLL VENOUS BLD VENIPUNCTURE: CPT

## 2021-11-23 PROCEDURE — 86769 SARS-COV-2 COVID-19 ANTIBODY: CPT

## 2021-11-23 PROCEDURE — 85027 COMPLETE CBC AUTOMATED: CPT

## 2021-11-23 PROCEDURE — 83605 ASSAY OF LACTIC ACID: CPT

## 2021-11-23 PROCEDURE — 82962 GLUCOSE BLOOD TEST: CPT

## 2021-11-23 PROCEDURE — 82330 ASSAY OF CALCIUM: CPT

## 2021-11-23 PROCEDURE — 93005 ELECTROCARDIOGRAM TRACING: CPT

## 2021-11-23 PROCEDURE — 84100 ASSAY OF PHOSPHORUS: CPT

## 2021-11-23 PROCEDURE — 85730 THROMBOPLASTIN TIME PARTIAL: CPT

## 2021-11-23 PROCEDURE — 97116 GAIT TRAINING THERAPY: CPT

## 2021-11-23 PROCEDURE — 96375 TX/PRO/DX INJ NEW DRUG ADDON: CPT

## 2021-11-23 PROCEDURE — 74177 CT ABD & PELVIS W/CONTRAST: CPT

## 2021-11-23 PROCEDURE — 82803 BLOOD GASES ANY COMBINATION: CPT

## 2021-11-23 PROCEDURE — 85014 HEMATOCRIT: CPT

## 2021-11-23 PROCEDURE — 96374 THER/PROPH/DIAG INJ IV PUSH: CPT

## 2021-11-23 PROCEDURE — 83880 ASSAY OF NATRIURETIC PEPTIDE: CPT

## 2021-11-23 PROCEDURE — 94760 N-INVAS EAR/PLS OXIMETRY 1: CPT

## 2021-11-23 PROCEDURE — 72192 CT PELVIS W/O DYE: CPT | Mod: MA

## 2021-11-23 PROCEDURE — 85610 PROTHROMBIN TIME: CPT

## 2021-11-23 PROCEDURE — 86703 HIV-1/HIV-2 1 RESULT ANTBDY: CPT

## 2021-11-23 PROCEDURE — 84132 ASSAY OF SERUM POTASSIUM: CPT

## 2021-11-23 PROCEDURE — 86901 BLOOD TYPING SEROLOGIC RH(D): CPT

## 2021-11-23 PROCEDURE — 86900 BLOOD TYPING SEROLOGIC ABO: CPT

## 2021-11-23 PROCEDURE — 99239 HOSP IP/OBS DSCHRG MGMT >30: CPT

## 2021-11-23 PROCEDURE — 80053 COMPREHEN METABOLIC PANEL: CPT

## 2021-11-23 PROCEDURE — 70450 CT HEAD/BRAIN W/O DYE: CPT | Mod: MD

## 2021-11-23 PROCEDURE — 72125 CT NECK SPINE W/O DYE: CPT | Mod: MD

## 2021-11-23 PROCEDURE — 0225U NFCT DS DNA&RNA 21 SARSCOV2: CPT

## 2021-11-23 PROCEDURE — 86850 RBC ANTIBODY SCREEN: CPT

## 2021-11-23 PROCEDURE — 84295 ASSAY OF SERUM SODIUM: CPT

## 2021-11-23 PROCEDURE — 82435 ASSAY OF BLOOD CHLORIDE: CPT

## 2021-11-23 PROCEDURE — 83735 ASSAY OF MAGNESIUM: CPT

## 2021-11-23 PROCEDURE — 74018 RADEX ABDOMEN 1 VIEW: CPT

## 2021-11-23 PROCEDURE — 84484 ASSAY OF TROPONIN QUANT: CPT

## 2021-11-23 PROCEDURE — 82947 ASSAY GLUCOSE BLOOD QUANT: CPT

## 2021-11-23 PROCEDURE — 36600 WITHDRAWAL OF ARTERIAL BLOOD: CPT

## 2021-11-23 PROCEDURE — 97163 PT EVAL HIGH COMPLEX 45 MIN: CPT

## 2021-11-23 PROCEDURE — 99233 SBSQ HOSP IP/OBS HIGH 50: CPT

## 2021-11-23 PROCEDURE — 81001 URINALYSIS AUTO W/SCOPE: CPT

## 2021-11-23 PROCEDURE — 71045 X-RAY EXAM CHEST 1 VIEW: CPT

## 2021-11-23 PROCEDURE — 87040 BLOOD CULTURE FOR BACTERIA: CPT

## 2021-11-23 PROCEDURE — 99285 EMERGENCY DEPT VISIT HI MDM: CPT

## 2021-11-23 PROCEDURE — 96372 THER/PROPH/DIAG INJ SC/IM: CPT

## 2021-11-23 PROCEDURE — 85025 COMPLETE CBC W/AUTO DIFF WBC: CPT

## 2021-11-23 PROCEDURE — 85018 HEMOGLOBIN: CPT

## 2021-11-23 PROCEDURE — 94660 CPAP INITIATION&MGMT: CPT

## 2021-11-23 PROCEDURE — 80048 BASIC METABOLIC PNL TOTAL CA: CPT

## 2021-11-23 PROCEDURE — 82550 ASSAY OF CK (CPK): CPT

## 2021-11-23 PROCEDURE — 97530 THERAPEUTIC ACTIVITIES: CPT

## 2021-11-23 RX ORDER — POLYETHYLENE GLYCOL 3350 17 G/17G
17 POWDER, FOR SOLUTION ORAL
Qty: 510 | Refills: 0
Start: 2021-11-23 | End: 2021-12-22

## 2021-11-23 RX ORDER — MEMANTINE HYDROCHLORIDE AND DONEPEZIL HYDROCHLORIDE 21; 10 MG/1; MG/1
1 CAPSULE ORAL
Qty: 0 | Refills: 0 | DISCHARGE

## 2021-11-23 RX ORDER — SENNA PLUS 8.6 MG/1
2 TABLET ORAL
Qty: 60 | Refills: 0
Start: 2021-11-23 | End: 2021-12-22

## 2021-11-23 RX ADMIN — Medication 25 MILLIGRAM(S): at 05:38

## 2021-11-23 RX ADMIN — POLYETHYLENE GLYCOL 3350 17 GRAM(S): 17 POWDER, FOR SOLUTION ORAL at 12:54

## 2021-11-23 RX ADMIN — ENOXAPARIN SODIUM 30 MILLIGRAM(S): 100 INJECTION SUBCUTANEOUS at 12:51

## 2021-11-23 RX ADMIN — Medication 40 MILLIGRAM(S): at 05:37

## 2021-11-23 RX ADMIN — Medication 81 MILLIGRAM(S): at 12:51

## 2021-11-23 NOTE — DISCHARGE NOTE NURSING/CASE MANAGEMENT/SOCIAL WORK - PATIENT PORTAL LINK FT
You can access the FollowMyHealth Patient Portal offered by [facility] by registering at the following website: [email or web address]. By joining [employer]’s FollowMyHealth portal, you will also be able to view your health information using other applications (apps) compatible with our system.

## 2021-11-23 NOTE — PROGRESS NOTE ADULT - SUBJECTIVE AND OBJECTIVE BOX
[place] CARDIOLOGY-[facility] Practice                                                               Office: [address] [address]                                                              Telephone: [phone]. Fax:[phone]                                                                             PROGRESS NOTE  Reason for follow up: Afib  Overnight: No new events.   Update: Pt states feeling ok. Denies shortness of brath, dyspnea. Noted to be tachypneic when speaking .  Plan for discharge today. TTE cancelled by admitting team.   tele d/c'd- as per record, SR, SB overnight.   no AC at this time       Review of symptoms:   Cardiac:  No chest pain. No dyspnea. No palpitations.  Respiratory:no cough. No dyspnea  Gastrointestinal: No diarrhea. No abdominal pain. No bleeding.   Neuro: No focal neuro complaints.      Vitals:  T(C): 36.6 (11-23-21 @ 07:24), Max: 36.6 (11-23-21 @ 07:24)  HR: 67 (11-23-21 @ 07:24) (67 - 124)  BP: 158/101 (11-23-21 @ 07:24) (93/61 - 158/101)  RR: 18 (11-23-21 @ 07:24) (16 - 18)  SpO2: 90% (11-23-21 @ 07:24) (90% - 98%)      I&O's Summary    22 Nov 2021 07:01  -  23 Nov 2021 07:00  --------------------------------------------------------  IN: 360 mL / OUT: 0 mL / NET: 360 mL        PHYSICAL EXAM:  Appearance: Comfortable. No acute distress.   HEENT:  Atraumatic. Normocephalic.  Normal oral mucosa  Neurologic: A & O x 3, no focal deficits. EOMI.  Cardiovascular: Normal S1 S2, No murmur, rubs/gallops.   Respiratory: Lungs clear to auscultation, unlabored   Gastrointestinal:  Soft, Non-tender, + BS  Lower Extremities: No edema  Psychiatry: Patient is calm.   Skin: No rashes/ ecchymoses/cyanosis/ulcers visualized on the face, hands or feet.      CURRENT MEDICATIONS:  metoprolol tartrate 25 milliGRAM(s) Oral two times a day    polyethylene glycol 3350  senna  dextrose 40% Gel  dextrose 50% Injectable  glucagon  Injectable  predniSONE   Tablet  aspirin enteric coated  dextrose 5%.  enoxaparin Injectable	      LABS:	 	  CARDIAC MARKERS ( 23 Nov 2021 07:55 )  x     / x     / x     / x     / x      p-BNP 23 Nov 2021 07:55: 1217 pg/mL, CARDIAC MARKERS ( 22 Nov 2021 02:07 )  x     / 0.06 ng/mL / x     / x     / x      p-BNP 22 Nov 2021 02:07: 2058 pg/mL                          13.0   8.64  )-----------( 202      ( 22 Nov 2021 02:07 )             43.5     11-22    137  |  95<L>  |  49.4<H>  ----------------------------<  102<H>  5.0   |  31.0<H>  |  1.19    Ca    9.0      22 Nov 2021 02:07  Phos  2.5     11-22  Mg     2.4     11-22      proBNP: Serum Pro-Brain Natriuretic Peptide: 1217 pg/mL (11-23 @ 07:55)  Serum Pro-Brain Natriuretic Peptide: 2058 pg/mL (11-22 @ 02:07)       TELEMETRY: telemetry d/c'd this am. Overnight SR, SB, PACs, Sinus arrythmia, no AFib noted in tele tech review

## 2021-11-23 NOTE — PROGRESS NOTE ADULT - ASSESSMENT
89 y/o F BIBA from respiratory distress. noted to be Afib RVR    Afib  - SR as per last telemetry review  - no AC at this time high risk for bleed  - cont metoprolol     Heart Failure  - TTE cancelled   - pro bNP trending down  - euvolemic on exam  - cont Po diuretics     Plan for discharge home today  Thank you for allowing me to participate in care of your patient.   Please call as needed.

## 2021-11-23 NOTE — DISCHARGE NOTE PROVIDER - NSDCMRMEDTOKEN_GEN_ALL_CORE_FT
aspirin 81 mg oral tablet, chewable: 1 tab(s) orally once a day  metoprolol tartrate 25 mg oral tablet: 1 tab(s) orally 2 times a day  Namzaric 28 mg-10 mg oral capsule, extended release: 1 cap(s) orally once a day   aluminum hydroxide-magnesium hydroxide 200 mg-200 mg/5 mL oral suspension: 30 milliliter(s) orally every 4 hours, As needed, Dyspepsia  aspirin 81 mg oral tablet, chewable: 1 tab(s) orally once a day  metoprolol tartrate 25 mg oral tablet: 1 tab(s) orally 2 times a day  polyethylene glycol 3350 oral powder for reconstitution: 17 gram(s) orally once a day  senna oral tablet: 2 tab(s) orally once a day (at bedtime)

## 2021-11-23 NOTE — DISCHARGE NOTE PROVIDER - CARE PROVIDER_API CALL
PMD,   Primary Medical Doctor  Phone: (   )    -  Fax: (   )    -  Follow Up Time:    PMD,   Primary Medical Doctor  Phone: (   )    -  Fax: (   )    -  Follow Up Time:     Trev Jacobs)  Cardiovascular Disease  [address] [facility], [place] [address]  Phone: [phone]  Fax: [phone]  Follow Up Time: 1 week

## 2021-11-23 NOTE — DISCHARGE NOTE PROVIDER - PROVIDER TOKENS
FREE:[LAST:[PMD],PHONE:[(   )    -],FAX:[(   )    -],ADDRESS:[Primary Medical Doctor]] FREE:[LAST:[PMD],PHONE:[(   )    -],FAX:[(   )    -],ADDRESS:[Primary Medical Doctor]],PROVIDER:[TOKEN:[[id number]],FOLLOWUP:[1 week]]

## 2021-11-23 NOTE — DISCHARGE NOTE PROVIDER - HOSPITAL COURSE
89 y/o F w/ PMH of advanced dementia, HTN, COPD was BIBA from respiratory distress.  Pt unable to provide history, therefore was obtained from daughter. States she noted increased work of breathing as well as the patient falling out of bed without LOC or head trauma. Patient was noted to be in acute hypoxic hypercapnic respiratory failure due to COPD exacerbation. CXR was negative for acute findings. CT head/cervical spine negative for acute pathology. Significant for respiratory acidosis on ABG with PaCO2 86 on RA. ED course included AVAPS, with short course of IV steroids and azithromycin. Aspiration precautions followed and maintained NPO while on NIV. Additional complaints of constipation, abdominal xray showed possible constipation vs SBO. Surgery consulted and recommendations appreciated; pt given bowel regimen for constipation as most likely pathology. Completed course of azithromycin and continue Prednisone taper as patient improving. PT evaluation includes fall precautions with decreased functional mobility, patient is able to return home as she lives with her daughter and is caretaker 24/7. Patient is stable for discharge. 89 y/o F w/ PMH of advanced dementia, HTN, COPD was BIBA from respiratory distress.  Pt unable to provide history, therefore was obtained from daughter. States she noted increased work of breathing as well as the patient falling out of bed without LOC or head trauma. Patient was noted to be in acute hypoxic hypercapnic respiratory failure due to COPD exacerbation. CXR was negative for acute findings. CT head/cervical spine negative for acute pathology. Significant for respiratory acidosis on ABG with PaCO2 86 on RA. ED course included AVAPS, with short course of IV steroids and azithromycin. Aspiration precautions followed and maintained NPO while on NIV. Additional complaints of constipation, abdominal xray showed possible constipation vs SBO. Surgery consulted and recommendations appreciated; pt given bowel regimen for constipation as most likely pathology. Completed course of azithromycin and continue Prednisone taper as patient improving. PT evaluation includes fall precautions with decreased functional mobility, patient is able to return home as she lives with her daughter and is caretaker 24/7. Patient is stable for discharge.    ROS: negative   PE; General: fragile, elderly lady, laying in bed, NAD   Head and neck: temporal wasting, no JVD   Cardio: regular rate and rhythm, (+) murmur  Pulm: clear breath sounds   GI: abdomen is soft, BS (+)   Extr: no edema   Neuro: AOx1, no focal weakness     ICU Vital Signs Last 24 Hrs  T(C): 36.6 (23 Nov 2021 07:24), Max: 36.6 (23 Nov 2021 07:24)  T(F): 97.8 (23 Nov 2021 07:24), Max: 97.8 (23 Nov 2021 07:24)  HR: 67 (23 Nov 2021 07:24) (67 - 124)  BP: 158/101 (23 Nov 2021 07:24) (93/61 - 158/101)  BP(mean): 71 (22 Nov 2021 17:45) (71 - 81)  ABP: --  ABP(mean): --  RR: 18 (23 Nov 2021 07:24) (16 - 18)  SpO2: 90% (23 Nov 2021 07:24) (90% - 98%)    1. Acute hypercapnic and hypoxic respiratory failure, resolved   Completed course of antibiotics and steroids     2. Urinary retention secondary to constipation   3. Paroxysmal afib   4. Dementia        89 y/o F w/ PMH of advanced dementia, HTN, COPD was BIBA from respiratory distress.  Pt unable to provide history, therefore was obtained from daughter. States she noted increased work of breathing as well as the patient falling out of bed without LOC or head trauma. Patient was noted to be in acute hypoxic hypercapnic respiratory failure due to COPD exacerbation. CXR was negative for acute findings. CT head/cervical spine negative for acute pathology. Significant for respiratory acidosis on ABG with PaCO2 86 on RA. ED course included AVAPS, with short course of IV steroids and azithromycin. Aspiration precautions followed and maintained NPO while on NIV. Additional complaints of constipation, abdominal xray showed possible constipation vs SBO. Surgery consulted and recommendations appreciated; pt given bowel regimen for constipation as most likely pathology. Completed course of azithromycin and continue Prednisone taper as patient improving. Seen by cardiology for new onset paroxysmal afib, heart rate now rate controlled. PT evaluation includes fall precautions with decreased functional mobility, patient is able to return home as she lives with her daughter and is caretaker 24/7. Patient is stable for discharge.    ROS: negative   PE; General: fragile, elderly lady, laying in bed, NAD   Head and neck: temporal wasting, no JVD   Cardio: regular rate and rhythm, (+) murmur  Pulm: clear breath sounds   GI: abdomen is soft, BS (+)   Extr: no edema   Neuro: AOx1, no focal weakness     ICU Vital Signs Last 24 Hrs  T(C): 36.6 (23 Nov 2021 07:24), Max: 36.6 (23 Nov 2021 07:24)  T(F): 97.8 (23 Nov 2021 07:24), Max: 97.8 (23 Nov 2021 07:24)  HR: 67 (23 Nov 2021 07:24) (67 - 124)  BP: 158/101 (23 Nov 2021 07:24) (93/61 - 158/101)  BP(mean): 71 (22 Nov 2021 17:45) (71 - 81)  ABP: --  ABP(mean): --  RR: 18 (23 Nov 2021 07:24) (16 - 18)  SpO2: 90% (23 Nov 2021 07:24) (90% - 98%)    1. Acute hypercapnic and hypoxic respiratory failure, resolved   Completed course of antibiotics and steroids     2. Urinary retention secondary to constipation   3. Paroxysmal afib   4. Dementia        89 y/o F w/ PMH of advanced dementia, HTN, COPD was BIBA from respiratory distress.  Pt unable to provide history, therefore was obtained from daughter. States she noted increased work of breathing as well as the patient falling out of bed without LOC or head trauma. Patient was noted to be in acute hypoxic hypercapnic respiratory failure due to COPD exacerbation. CXR was negative for acute findings. CT head/cervical spine negative for acute pathology. Significant for respiratory acidosis on ABG with PaCO2 86 on RA. ED course included AVAPS, with short course of IV steroids and azithromycin. Aspiration precautions followed and maintained NPO while on NIV. Additional complaints of constipation, abdominal xray showed possible constipation vs SBO. Surgery consulted and recommendations appreciated; pt given bowel regimen for constipation as most likely pathology. Completed course of azithromycin and continue Prednisone taper as patient improving. Seen by cardiology for new onset paroxysmal afib, heart rate now rate controlled. PT evaluation includes fall precautions with decreased functional mobility, patient is able to return home as she lives with her daughter and is caretaker 24/7. Patient is stable for discharge.    ROS: negative   PE; General: fragile, elderly lady, laying in bed, NAD   Head and neck: temporal wasting, no JVD   Cardio: regular rate and rhythm, (+) murmur  Pulm: clear breath sounds   GI: abdomen is soft, BS (+)   Extr: no edema   Neuro: AOx1, no focal weakness     ICU Vital Signs Last 24 Hrs  T(C): 36.6 (23 Nov 2021 07:24), Max: 36.6 (23 Nov 2021 07:24)  T(F): 97.8 (23 Nov 2021 07:24), Max: 97.8 (23 Nov 2021 07:24)  HR: 67 (23 Nov 2021 07:24) (67 - 124)  BP: 158/101 (23 Nov 2021 07:24) (93/61 - 158/101)  BP(mean): 71 (22 Nov 2021 17:45) (71 - 81)  ABP: --  ABP(mean): --  RR: 18 (23 Nov 2021 07:24) (16 - 18)  SpO2: 90% (23 Nov 2021 07:24) (90% - 98%)    1. Acute hypercapnic and hypoxic respiratory failure, resolved   Completed course of antibiotics and steroids     2. Urinary retention secondary to constipation   3. Paroxysmal afib   4. Dementia     Discharge planning time 35 minutes.

## 2021-11-23 NOTE — PROGRESS NOTE ADULT - TIME BILLING
Heart Failure   on diuretics.   atrial fibrillation now in sinus. not a candidate for long term anticoagulation due to risk of bleeding.   No further in-patient cardiac work-up/management is needed.  Follow-up in cardiology office in 2 weeks.   home with home with home care.

## 2021-11-23 NOTE — PROGRESS NOTE ADULT - REASON FOR ADMISSION
acute respiratory failure

## 2021-11-23 NOTE — DISCHARGE NOTE PROVIDER - NSDCCPCAREPLAN_GEN_ALL_CORE_FT
PRINCIPAL DISCHARGE DIAGNOSIS  Diagnosis: Acute respiratory failure with hypoxia and hypercapnia  Assessment and Plan of Treatment: -Continue Prednisone taper  - AVAPS to be used nightly       PRINCIPAL DISCHARGE DIAGNOSIS  Diagnosis: Acute respiratory failure with hypoxia and hypercapnia  Assessment and Plan of Treatment: Improved      SECONDARY DISCHARGE DIAGNOSES  Diagnosis: HTN (hypertension)  Assessment and Plan of Treatment: Take your medications as prescribed    Diagnosis: Atrial fibrillation  Assessment and Plan of Treatment: Take your medications as prrescribed    Diagnosis: Dementia  Assessment and Plan of Treatment: Supportive care

## 2021-11-23 NOTE — DISCHARGE NOTE PROVIDER - CARE PROVIDERS DIRECT ADDRESSES
,DirectAddress_Unknown ,DirectAddress_Unknown,[email or web address]

## 2021-11-24 NOTE — CHART NOTE - NSCHARTNOTEFT_GEN_A_CORE
[employer] Program Note:    Initial Screening    Date of Initial Screenin21    Patient qualifies for Food As [employer] Program  [ x ] Patient qualifies for [employer] As Health Program w/ health condition  [  ] Patient does not qualify for Food As [employer] Program w/ no health conditions    Diagnosis that qualifies patient for program  [ x ] Hypertension  [  ] Diabetes  [  ] Unintended weight loss  [  ] Obesity  [ x ] CHF  [x  ] Other- COPD    Additional Comments:    originally spoke with patient's niece  who mentioned to speak with daughter. Spoke with daughter  and provided nutrition education, recipes, and food pantries      Current Patient Diet:      Actions Taken:  [ x ] Spoke with patient  [x  ] [employer] survey completed  Additional Comments: will be hard for patient to come back and  groceries monthly. Will work out a plan before next F/U          Non-qualification for [employer] Program  [   ] D/C to [facility]  [   ] Referred to Social Work  [  ] Declined [employer] Program  [   ] D/C Before Seen By RD  Participant Phone Number:  RD Comments:              Discharge Visit  [ X ] Initial Screening already completed    Date of D/C: 21  [ X ]  Patient provided with healthy groceries  [ X ] Follow Up appointment made  [ X ] Other community outreach given  [X  ] Help with SNAP application at F/U appointment  [  ] Patient D/C while RD was unavailable. Will call to schedule pick-up

## 2021-12-08 ENCOUNTER — TRANSCRIPTION ENCOUNTER (OUTPATIENT)
Age: 86
End: 2021-12-08

## 2022-06-24 ENCOUNTER — INPATIENT (INPATIENT)
Facility: HOSPITAL | Age: 87
LOS: 5 days | Discharge: HOME | DRG: 193 | End: 2022-06-30
Attending: INTERNAL MEDICINE | Admitting: HOSPITALIST
Payer: MEDICARE

## 2022-06-24 VITALS
OXYGEN SATURATION: 87 % | DIASTOLIC BLOOD PRESSURE: 52 MMHG | HEART RATE: 139 BPM | WEIGHT: 89.95 LBS | SYSTOLIC BLOOD PRESSURE: 77 MMHG | RESPIRATION RATE: 26 BRPM | HEIGHT: 68 IN

## 2022-06-24 DIAGNOSIS — I48.20 CHRONIC ATRIAL FIBRILLATION, UNSPECIFIED: ICD-10-CM

## 2022-06-24 DIAGNOSIS — I50.9 HEART FAILURE, UNSPECIFIED: ICD-10-CM

## 2022-06-24 DIAGNOSIS — I10 ESSENTIAL (PRIMARY) HYPERTENSION: ICD-10-CM

## 2022-06-24 DIAGNOSIS — I50.33 ACUTE ON CHRONIC DIASTOLIC (CONGESTIVE) HEART FAILURE: ICD-10-CM

## 2022-06-24 LAB
ALBUMIN SERPL ELPH-MCNC: 4.1 G/DL — SIGNIFICANT CHANGE UP (ref 3.3–5.2)
ALP SERPL-CCNC: 81 U/L — SIGNIFICANT CHANGE UP (ref 40–120)
ALT FLD-CCNC: 13 U/L — SIGNIFICANT CHANGE UP
ANION GAP SERPL CALC-SCNC: 11 MMOL/L — SIGNIFICANT CHANGE UP (ref 5–17)
APPEARANCE UR: CLEAR — SIGNIFICANT CHANGE UP
APTT BLD: 27.1 SEC — LOW (ref 27.5–35.5)
AST SERPL-CCNC: 28 U/L — SIGNIFICANT CHANGE UP
BASE EXCESS BLDV CALC-SCNC: 8.4 MMOL/L — HIGH (ref -2–3)
BASOPHILS # BLD AUTO: 0.03 K/UL — SIGNIFICANT CHANGE UP (ref 0–0.2)
BASOPHILS NFR BLD AUTO: 0.4 % — SIGNIFICANT CHANGE UP (ref 0–2)
BILIRUB SERPL-MCNC: 0.4 MG/DL — SIGNIFICANT CHANGE UP (ref 0.4–2)
BILIRUB UR-MCNC: NEGATIVE — SIGNIFICANT CHANGE UP
BUN SERPL-MCNC: 17.9 MG/DL — SIGNIFICANT CHANGE UP (ref 8–20)
CA-I SERPL-SCNC: 1.19 MMOL/L — SIGNIFICANT CHANGE UP (ref 1.15–1.33)
CALCIUM SERPL-MCNC: 9.4 MG/DL — SIGNIFICANT CHANGE UP (ref 8.6–10.2)
CHLORIDE BLDV-SCNC: 99 MMOL/L — SIGNIFICANT CHANGE UP (ref 98–107)
CHLORIDE SERPL-SCNC: 97 MMOL/L — LOW (ref 98–107)
CO2 SERPL-SCNC: 32 MMOL/L — HIGH (ref 22–29)
COLOR SPEC: YELLOW — SIGNIFICANT CHANGE UP
CREAT SERPL-MCNC: 1.08 MG/DL — SIGNIFICANT CHANGE UP (ref 0.5–1.3)
DIFF PNL FLD: ABNORMAL
EGFR: 47 ML/MIN/1.73M2 — LOW
EOSINOPHIL # BLD AUTO: 0.07 K/UL — SIGNIFICANT CHANGE UP (ref 0–0.5)
EOSINOPHIL NFR BLD AUTO: 1 % — SIGNIFICANT CHANGE UP (ref 0–6)
EPI CELLS # UR: SIGNIFICANT CHANGE UP
GAS PNL BLDV: 138 MMOL/L — SIGNIFICANT CHANGE UP (ref 136–145)
GAS PNL BLDV: SIGNIFICANT CHANGE UP
GAS PNL BLDV: SIGNIFICANT CHANGE UP
GLUCOSE BLDV-MCNC: 114 MG/DL — HIGH (ref 70–99)
GLUCOSE SERPL-MCNC: 108 MG/DL — HIGH (ref 70–99)
GLUCOSE UR QL: NEGATIVE MG/DL — SIGNIFICANT CHANGE UP
HCO3 BLDV-SCNC: 35 MMOL/L — HIGH (ref 22–29)
HCT VFR BLD CALC: 46.2 % — HIGH (ref 34.5–45)
HCT VFR BLDA CALC: 43 % — SIGNIFICANT CHANGE UP
HGB BLD CALC-MCNC: 14.2 G/DL — SIGNIFICANT CHANGE UP (ref 11.7–16.1)
HGB BLD-MCNC: 13.9 G/DL — SIGNIFICANT CHANGE UP (ref 11.5–15.5)
IMM GRANULOCYTES NFR BLD AUTO: 0.3 % — SIGNIFICANT CHANGE UP (ref 0–1.5)
INR BLD: 1.09 RATIO — SIGNIFICANT CHANGE UP (ref 0.88–1.16)
KETONES UR-MCNC: NEGATIVE — SIGNIFICANT CHANGE UP
LACTATE BLDV-MCNC: 1.9 MMOL/L — SIGNIFICANT CHANGE UP (ref 0.5–2)
LEUKOCYTE ESTERASE UR-ACNC: NEGATIVE — SIGNIFICANT CHANGE UP
LYMPHOCYTES # BLD AUTO: 1.15 K/UL — SIGNIFICANT CHANGE UP (ref 1–3.3)
LYMPHOCYTES # BLD AUTO: 16.7 % — SIGNIFICANT CHANGE UP (ref 13–44)
MCHC RBC-ENTMCNC: 27.2 PG — SIGNIFICANT CHANGE UP (ref 27–34)
MCHC RBC-ENTMCNC: 30.1 GM/DL — LOW (ref 32–36)
MCV RBC AUTO: 90.4 FL — SIGNIFICANT CHANGE UP (ref 80–100)
MONOCYTES # BLD AUTO: 0.49 K/UL — SIGNIFICANT CHANGE UP (ref 0–0.9)
MONOCYTES NFR BLD AUTO: 7.1 % — SIGNIFICANT CHANGE UP (ref 2–14)
NEUTROPHILS # BLD AUTO: 5.14 K/UL — SIGNIFICANT CHANGE UP (ref 1.8–7.4)
NEUTROPHILS NFR BLD AUTO: 74.5 % — SIGNIFICANT CHANGE UP (ref 43–77)
NITRITE UR-MCNC: NEGATIVE — SIGNIFICANT CHANGE UP
NT-PROBNP SERPL-SCNC: 2247 PG/ML — HIGH (ref 0–300)
PCO2 BLDV: 69 MMHG — HIGH (ref 39–42)
PH BLDV: 7.31 — LOW (ref 7.32–7.43)
PH UR: 6 — SIGNIFICANT CHANGE UP (ref 5–8)
PLATELET # BLD AUTO: 146 K/UL — LOW (ref 150–400)
PO2 BLDV: 51 MMHG — HIGH (ref 25–45)
POTASSIUM BLDV-SCNC: 4.3 MMOL/L — SIGNIFICANT CHANGE UP (ref 3.5–5.1)
POTASSIUM SERPL-MCNC: 4.7 MMOL/L — SIGNIFICANT CHANGE UP (ref 3.5–5.3)
POTASSIUM SERPL-SCNC: 4.7 MMOL/L — SIGNIFICANT CHANGE UP (ref 3.5–5.3)
PROT SERPL-MCNC: 7.4 G/DL — SIGNIFICANT CHANGE UP (ref 6.6–8.7)
PROT UR-MCNC: NEGATIVE — SIGNIFICANT CHANGE UP
PROTHROM AB SERPL-ACNC: 12.6 SEC — SIGNIFICANT CHANGE UP (ref 10.5–13.4)
RAPID RVP RESULT: DETECTED
RBC # BLD: 5.11 M/UL — SIGNIFICANT CHANGE UP (ref 3.8–5.2)
RBC # FLD: 13.9 % — SIGNIFICANT CHANGE UP (ref 10.3–14.5)
RBC CASTS # UR COMP ASSIST: SIGNIFICANT CHANGE UP /HPF (ref 0–4)
RV+EV RNA SPEC QL NAA+PROBE: DETECTED
SAO2 % BLDV: 83.8 % — SIGNIFICANT CHANGE UP
SARS-COV-2 RNA SPEC QL NAA+PROBE: SIGNIFICANT CHANGE UP
SODIUM SERPL-SCNC: 140 MMOL/L — SIGNIFICANT CHANGE UP (ref 135–145)
SP GR SPEC: 1.01 — SIGNIFICANT CHANGE UP (ref 1.01–1.02)
TROPONIN T SERPL-MCNC: 0.03 NG/ML — SIGNIFICANT CHANGE UP (ref 0–0.06)
UROBILINOGEN FLD QL: NEGATIVE MG/DL — SIGNIFICANT CHANGE UP
WBC # BLD: 6.9 K/UL — SIGNIFICANT CHANGE UP (ref 3.8–10.5)
WBC # FLD AUTO: 6.9 K/UL — SIGNIFICANT CHANGE UP (ref 3.8–10.5)
WBC UR QL: NEGATIVE /HPF — SIGNIFICANT CHANGE UP (ref 0–5)

## 2022-06-24 PROCEDURE — 99223 1ST HOSP IP/OBS HIGH 75: CPT

## 2022-06-24 PROCEDURE — 71045 X-RAY EXAM CHEST 1 VIEW: CPT | Mod: 26

## 2022-06-24 PROCEDURE — 99291 CRITICAL CARE FIRST HOUR: CPT

## 2022-06-24 PROCEDURE — 93010 ELECTROCARDIOGRAM REPORT: CPT

## 2022-06-24 RX ORDER — PIPERACILLIN AND TAZOBACTAM 4; .5 G/20ML; G/20ML
3.38 INJECTION, POWDER, LYOPHILIZED, FOR SOLUTION INTRAVENOUS ONCE
Refills: 0 | Status: COMPLETED | OUTPATIENT
Start: 2022-06-24 | End: 2022-06-24

## 2022-06-24 RX ORDER — FUROSEMIDE 40 MG
60 TABLET ORAL ONCE
Refills: 0 | Status: COMPLETED | OUTPATIENT
Start: 2022-06-24 | End: 2022-06-24

## 2022-06-24 RX ORDER — MEMANTINE HYDROCHLORIDE AND DONEPEZIL HYDROCHLORIDE 21; 10 MG/1; MG/1
0 CAPSULE ORAL
Qty: 0 | Refills: 4 | DISCHARGE

## 2022-06-24 RX ORDER — METOPROLOL TARTRATE 50 MG
25 TABLET ORAL ONCE
Refills: 0 | Status: COMPLETED | OUTPATIENT
Start: 2022-06-24 | End: 2022-06-24

## 2022-06-24 RX ORDER — FUROSEMIDE 40 MG
40 TABLET ORAL ONCE
Refills: 0 | Status: COMPLETED | OUTPATIENT
Start: 2022-06-24 | End: 2022-06-24

## 2022-06-24 RX ORDER — ASPIRIN/CALCIUM CARB/MAGNESIUM 324 MG
1 TABLET ORAL
Qty: 0 | Refills: 0 | DISCHARGE

## 2022-06-24 RX ORDER — METOPROLOL TARTRATE 50 MG
5 TABLET ORAL ONCE
Refills: 0 | Status: COMPLETED | OUTPATIENT
Start: 2022-06-24 | End: 2022-06-24

## 2022-06-24 RX ORDER — ONDANSETRON 8 MG/1
4 TABLET, FILM COATED ORAL EVERY 8 HOURS
Refills: 0 | Status: DISCONTINUED | OUTPATIENT
Start: 2022-06-24 | End: 2022-06-30

## 2022-06-24 RX ORDER — ACETAMINOPHEN 500 MG
650 TABLET ORAL EVERY 6 HOURS
Refills: 0 | Status: DISCONTINUED | OUTPATIENT
Start: 2022-06-24 | End: 2022-06-30

## 2022-06-24 RX ORDER — ERGOCALCIFEROL 1.25 MG/1
0 CAPSULE ORAL
Qty: 0 | Refills: 0 | DISCHARGE

## 2022-06-24 RX ORDER — FUROSEMIDE 40 MG
40 TABLET ORAL
Refills: 0 | Status: DISCONTINUED | OUTPATIENT
Start: 2022-06-24 | End: 2022-06-25

## 2022-06-24 RX ORDER — METOPROLOL TARTRATE 50 MG
1 TABLET ORAL
Qty: 0 | Refills: 0 | DISCHARGE

## 2022-06-24 RX ORDER — FUROSEMIDE 40 MG
40 TABLET ORAL ONCE
Refills: 0 | Status: DISCONTINUED | OUTPATIENT
Start: 2022-06-24 | End: 2022-06-24

## 2022-06-24 RX ORDER — ALBUTEROL 90 UG/1
2.5 AEROSOL, METERED ORAL
Refills: 0 | Status: COMPLETED | OUTPATIENT
Start: 2022-06-24 | End: 2022-06-24

## 2022-06-24 RX ORDER — VANCOMYCIN HCL 1 G
1000 VIAL (EA) INTRAVENOUS ONCE
Refills: 0 | Status: COMPLETED | OUTPATIENT
Start: 2022-06-24 | End: 2022-06-24

## 2022-06-24 RX ADMIN — ALBUTEROL 2.5 MILLIGRAM(S): 90 AEROSOL, METERED ORAL at 21:46

## 2022-06-24 RX ADMIN — PIPERACILLIN AND TAZOBACTAM 200 GRAM(S): 4; .5 INJECTION, POWDER, LYOPHILIZED, FOR SOLUTION INTRAVENOUS at 19:35

## 2022-06-24 RX ADMIN — Medication 60 MILLIGRAM(S): at 21:43

## 2022-06-24 RX ADMIN — PIPERACILLIN AND TAZOBACTAM 3.38 GRAM(S): 4; .5 INJECTION, POWDER, LYOPHILIZED, FOR SOLUTION INTRAVENOUS at 20:05

## 2022-06-24 RX ADMIN — Medication 25 MILLIGRAM(S): at 20:01

## 2022-06-24 RX ADMIN — Medication 250 MILLIGRAM(S): at 20:25

## 2022-06-24 RX ADMIN — ALBUTEROL 2.5 MILLIGRAM(S): 90 AEROSOL, METERED ORAL at 20:49

## 2022-06-24 RX ADMIN — Medication 5 MILLIGRAM(S): at 19:35

## 2022-06-24 RX ADMIN — Medication 125 MILLIGRAM(S): at 20:01

## 2022-06-24 RX ADMIN — Medication 1000 MILLIGRAM(S): at 21:21

## 2022-06-24 RX ADMIN — ALBUTEROL 2.5 MILLIGRAM(S): 90 AEROSOL, METERED ORAL at 20:27

## 2022-06-24 NOTE — ED PROVIDER NOTE - CARE PLAN
1 Principal Discharge DX:	CHF exacerbation  Secondary Diagnosis:	Viral URI with cough  Secondary Diagnosis:	Acute respiratory failure with hypoxia

## 2022-06-24 NOTE — PATIENT PROFILE ADULT - FALL HARM RISK - HARM RISK INTERVENTIONS
Assistance with ambulation/Assistance OOB with selected safe patient handling equipment/Communicate Risk of Fall with Harm to all staff/Monitor for mental status changes/Move patient closer to nurses' station/Reinforce activity limits and safety measures with patient and family/Reorient to person, place and time as needed/Tailored Fall Risk Interventions/Toileting schedule using arm’s reach rule for commode and bathroom/Use of alarms - bed, chair and/or voice tab/Visual Cue: Yellow wristband and red socks/Bed in lowest position, wheels locked, appropriate side rails in place/Call bell, personal items and telephone in reach/Instruct patient to call for assistance before getting out of bed or chair/Non-slip footwear when patient is out of bed/[place] to call system/Physically safe environment - no spills, clutter or unnecessary equipment/Purposeful Proactive Rounding/Room/bathroom lighting operational, light cord in reach

## 2022-06-24 NOTE — ED PROVIDER NOTE - CLINICAL SUMMARY MEDICAL DECISION MAKING FREE TEXT BOX
Pt is a 89 y.o. F hx CHF, COPD, Alzheimer's disease, advanced dementia, HTN, presenting with cough, shortness of breath, hypoxia, and tachycarida for one day. Labs, meds, imaging, ecg.

## 2022-06-24 NOTE — ED ADULT TRIAGE NOTE - CHIEF COMPLAINT QUOTE
hx alzheimers, confused at baseline, worsening sob and more confused than normal. pt placed on po azithromycin by primary md. patient is hypotensive, hypoxic on room air and tachycardic, pt brought to critical care, code sepsis activated

## 2022-06-24 NOTE — CONSULT NOTE ADULT - NS ATTEND AMEND GEN_ALL_CORE FT
Patient is a 89yo F w/ HFpEF, COPD, Alzheimer's disease, advanced dementia, HTN, admitted after experiencing ongoing shortness of breath, chest congestion and non productive cough, likely due to Viral PNA vs. acute on chronic dHF.  Patient euvolemic on exam and responded well to Lasix IV in the EDU.  History obtained via daughter by the bedside. proBNP: 2250, Trop x1 negative.     Patient seen by  me in CDU 10. Patient is sleeping.  Patient is no distress    Chest- Bilateral Clear BS  Cardiac- S1 and S2  Abdomen- soft    A/P  1. Afib- Rate control, no AC due to fall risk  2. HFpEF- will give lasix 20 mg X 2 doses      will follow and check echo

## 2022-06-24 NOTE — CONSULT NOTE ADULT - ASSESSMENT
10yo F w/ HFpEF, COPD, Alzheimer's disease, advanced dementia, HTN, admitted after experiencing ongoing shortness of breath, chest congestion and non productive cough, likely due to Viral PNA vs. acute on chronic dHF.  Patient euvolemic on exam and responded well to Lasix IV in the EDU.  History obtained via daughter by the bedside. proBNP: 2250, Trop x1 negative Patient is a 89yo F w/ HFpEF, COPD, Alzheimer's disease, advanced dementia, HTN, admitted after experiencing ongoing shortness of breath, chest congestion and non productive cough, likely due to Viral PNA vs. acute on chronic dHF.  Patient euvolemic on exam and responded well to Lasix IV in the EDU.  History obtained via daughter by the bedside. proBNP: 2250, Trop x1 negative

## 2022-06-24 NOTE — ED ADULT NURSE REASSESSMENT NOTE - NS ED NURSE REASSESS COMMENT FT1
Pt with consistent atrial fibrillation, seen by cardiology PA. Given 60mg Lasix and straight cath completed with 300cc output. Pt reports that she is more comfortable now and is resting comfortably. Denies any medical complaints at this time.

## 2022-06-24 NOTE — CONSULT NOTE ADULT - SUBJECTIVE AND OBJECTIVE BOX
[facility]                                              CARDIOLOGY AT [address], [place] [address]                                             Telephone: [phone]. Fax:[phone]                                                       CARDIOLOGY CONSULTATION NOTE                                                                                             History obtained by: Patient and medical record  Community Cardiologist: Dr. Rico   obtained: Yes [  ] No [  ]  Reason for Consultation: SOB  Available out pt records reviewed: Yes [X] No [  ]    Chief complaint: Mom has SOB since this monday     HPI: Patient is a 89yo F w/ PMHx of CHF, COPD, Alzheimer's disease, advanced dementia, HTN, admitted after patient developed shortness of breath.  As per daughter mom waas at a concert last Saturday and AC was on, since then not feeling well, has chest congestion and shortness of breath.  Gave mom expectorants w/o full resolution.  Mom also has cough for four days and mild dyspnea.  History obtained through daughter by the bedside.  Brought her in for evaluation today because symptoms remained same.  Denies CP, palpitations, fever, chills, N/V or diaphoresis.      CARDIAC TESTING   ECHO: Pen  STRESS:  CATH:   ELECTROPHYSIOLOGY:     PAST MEDICAL HISTORY  Alzheimer disease  Congestive heart failure (CHF)  Chronic obstructive pulmonary disease (COPD)    PAST SURGICAL HISTORY  No significant past surgical history    SOCIAL HISTORY:  Denies smoking/alcohol/drugs    FAMILY HISTORY: No pertinent family history in first degree relatives    Family History of Cardiovascular Disease:  Yes [X] No [  ]  Coronary Artery Disease in first degree relative: Yes [X] No [  ]  Sudden Cardiac Death in First degree relative: Yes [  ] No [  ]    HOME MEDICATIONS:  aluminum hydroxide-magnesium hydroxide 200 mg-200 mg/5 mL oral suspension: 30 milliliter(s) orally every 4 hours, As needed, Dyspepsia (2021 11:31)  aspirin 81 mg oral tablet, chewable: 1 tab(s) orally once a day (2021 16:33)  metoprolol tartrate 25 mg oral tablet: 1 tab(s) orally 2 times a day (2021 16:33)    CURRENT CARDIAC MEDICATIONS:      ALLERGIES:  No Known Drug Allergies  [place] (Unknown)    REVIEW OF SYMPTOMS:   CONSTITUTIONAL: No fever, no chills, no weight loss, no weight gain, + fatigue, +congestion   ENMT: No vertigo  NECK: No pain or stiffness  CARDIOVASCULAR: No chest pain, + dyspnea, no syncope/presyncope, no palpitations, no dizziness, no orthopnea, no Paroxsymal nocturnal dyspnea  RESPIRATORY: + shortness of breath, no cough, no wheezing  : No dysuria, no hematuria   GI: No dark color stool, no nausea, no diarrhea, no constipation, no abdominal pain   NEURO: + dementia, No headache, no slurred speech   MUSCULOSKELETAL: No joint pain or swelling; No muscle, back, or extremity pain  PSYCH: No agitation, no anxiety.    ALL OTHER REVIEW OF SYSTEMS ARE NEGATIVE.    VITAL SIGNS:  T(C): --  T(F): --  HR: 126 (22 @ 21:44) (89 - 204)  BP: 150/90 (22 @ 21:44) (77/52 - 175/90)  RR: 20 (22 @ 21:44) (18 - 26)  SpO2: 96% (22 @ 21:44) (87% - 100%)    INTAKE AND OUTPUT:     PHYSICAL EXAM:  Constitutional: patient getting straight Cath after Lasix IV, mild acute distress   HEENT: Atraumatic, neck is supple, no JVD  CNS: Alert & Awake, [place] x4 equally, sensory intact   Respiratory: CTAB, unlabored   Cardiovascular: Igger/irreg, S1S2, No murmur or rubs  Gastrointestinal: Soft, non-tender +Bowel sounds.   Extremities: 2+ Peripheral Pulses, No clubbing, cyanosis, or edema  Psychiatric: Calm   Skin: Warm and dry, no ulcers on extremities     LABS:  ( 2022 19:39 )  Troponin T  0.03 ,  CPK  X    , CKMB  X    , BNP 2247<H>                        13.9   6.90  )-----------( 146      ( 2022 19:36 )             46.2     -    140  |  97<L>  |  17.9  ----------------------------<  108<H>  4.7   |  32.0<H>  |  1.08    Ca    9.4      2022 19:36    TPro  7.4  /  Alb  4.1  /  TBili  0.4  /  DBili  x   /  AST  28  /  ALT  13  /  AlkPhos  81      PT/INR - ( 2022 19:36 )   PT: 12.6 sec;   INR: 1.09 ratio      PTT - ( 2022 19:36 )  PTT:27.1 sec  Urinalysis Basic - ( 2022 21:39 )    Color: Yellow / Appearance: Clear / S.010 / pH: x  Gluc: x / Ketone: Negative  / Bili: Negative / Urobili: Negative mg/dL   Blood: x / Protein: Negative / Nitrite: Negative   Leuk Esterase: Negative / RBC: 0-2 /HPF / WBC Negative /HPF   Sq Epi: x / Non Sq Epi: Occasional / Bacteria: x    INTERPRETATION OF TELEMETRY: A-fib w/ RVR   ECG: A fib w/ RVR   Prior ECG: Yes [X] No [  ]    RADIOLOGY & ADDITIONAL STUDIES:   Preliminary X-ray: No vascular congestion

## 2022-06-24 NOTE — ED PROVIDER NOTE - PROGRESS NOTE DETAILS
Chet Centeno, Resident: Afib likely secondary to chf exacerbation, HR responded to diuretics. Cardiology agrees to admission. Pending echo.

## 2022-06-24 NOTE — ED PROVIDER NOTE - PHYSICAL EXAMINATION
General: mild respiratory distress  Head:  NC, AT  Eyes: EOMI, PERRLA, no scleral icterus  Ears: no erythema/drainage  Nose: midline, no bleeding/drainage  Throat: MMM  Cardiac: rapid rate, irregular rhythm, no m/r/g, no lower extremity edema  Respiratory: rhonchi bilaterally, SpO2 84% on RA, equal chest wall expansions, no use of accessory muscles, no retractions  Abdomen: soft, nondistended, nontender, no rebound tenderness, no guarding, nonperitonitic  MSK/Vascular: soft compartments, warm extremities  Neuro: Alert and oriented x1 (baseline), motor/sensory intact, [place]  Psych: calm, cooperative

## 2022-06-24 NOTE — ED ADULT NURSE NOTE - OBJECTIVE STATEMENT
Brought to ED by daughter for 3 days of cough and mucus. Pt with PMHx of Alzheimer's is oriented only to self at her baseline. Is able to talk in full sentences but noted to be 88% on RA. HR in the 140s, pt denies chest pain or other symptoms. Daughter states that she is not always compliant with her medication and missed her metoprolol today.

## 2022-06-24 NOTE — CONSULT NOTE ADULT - PROBLEM SELECTOR RECOMMENDATION 9
HF w/ preserved EF>50% (dHF),   - HF etiologies include CAD, HTNve, Valvular dysfunction, disease of the myocardium, metabolic, and renovascular  - will schedule TTE to assess systolic & distolic function, chamber size, valvular function, filling pressures, and wall motion abnormalities  - check serial Trops, CBC, CMP, TFTs, LFTs, UA, pro-BNP level to check for elevated ventricular pressures, (>500pg/mL) highly predictive of HF   - check ECG for possible ischemia, MI or dysrythmias  - GDMT for HF w/ preserved EF: control systolic and diastolic BP, on BB   - control volume overload symptoms w/ diuretics: Lasix 40mg IV daily for 48hours  - manage A-fib that is present  - Strict I/O, daily weights, low Na diet  - avoid CCB in patients w/ low EF (if needed on board only Norvasc safe)  - weight reduction, daily activities HF w/ preserved EF>50% (dHF),   - Check echo  -  - GDMT for HF w/ preserved EF: control systolic and diastolic BP, on BB   - control volume overload symptoms w/ diuretics: Lasix 40mg IV daily for 48hours  - manage A-fib that is present  - Strict I/O, daily weights, low Na diet  -

## 2022-06-24 NOTE — ED ADULT NURSE NOTE - NSIMPLEMENTINTERV_GEN_ALL_ED
Implemented All Universal Safety Interventions:  [place] to call system. Call bell, personal items and telephone within reach. Instruct patient to call for assistance. Room bathroom lighting operational. Non-slip footwear when patient is off stretcher. Physically safe environment: no spills, clutter or unnecessary equipment. Stretcher in lowest position, wheels locked, appropriate side rails in place.

## 2022-06-24 NOTE — CONSULT NOTE ADULT - PROBLEM SELECTOR RECOMMENDATION 2
Atrial fib, patient asymptomatic with slow ventricular response  - [id number]= 5  - no AC due to risk of fall and bleed, monitor daily CBC, rate control w/ BB  - awaiting TTE in AM  - AF is a risk factor for cognitive impairment and dementia and mortality. Physical activity and higher cardiorespiratory fitness may protect against mortality in AF. Atrial fib, patient asymptomatic with slow ventricular response  - [id number]= 5  - no AC due to risk of fall and bleed, monitor daily CBC, rate control w/ BB  - awaiting TTE in AM  - A

## 2022-06-24 NOTE — ED PROVIDER NOTE - ATTENDING CONTRIBUTION TO CARE
89yo F with PMH congestive heart failure, COPD, dementia, HTN presenting with shortness of breath x 1 day. No fevers/chills. Found to be in rapid atrial fibrillation with signs of COPD/volume overload. Improved on supplemental O2 with diuresis. Rate controlled with metoprolol. Tx with antibiotics and admission. Keerthi Muñoz DO     I performed a history and physical exam of the patient and discussed their management with the resident. I reviewed the resident's note and agree with the documented findings and plan of care. My medical decision making and observations are found above.

## 2022-06-24 NOTE — ED PROVIDER NOTE - OBJECTIVE STATEMENT
Pt is a 89 y.o. F hx CHF, COPD, Alzheimer's disease, Pt is a 89 y.o. F hx CHF, COPD, Alzheimer's disease, advanced dementia, HTN, presenting with shortness of breath for one day. The pt has had a cough for four days after attending a concert.  Pt unable to provide history, therefore was obtained from daughter. Daughter states the pt complained of shortness of breath prompting ED evaluation. Denies any other complaints.

## 2022-06-25 PROBLEM — G30.9 ALZHEIMER'S DISEASE, UNSPECIFIED: Chronic | Status: ACTIVE | Noted: 2021-11-18

## 2022-06-25 PROBLEM — J44.9 CHRONIC OBSTRUCTIVE PULMONARY DISEASE, UNSPECIFIED: Chronic | Status: ACTIVE | Noted: 2021-11-18

## 2022-06-25 PROBLEM — I50.9 HEART FAILURE, UNSPECIFIED: Chronic | Status: ACTIVE | Noted: 2021-11-18

## 2022-06-25 LAB
A1C WITH ESTIMATED AVERAGE GLUCOSE RESULT: 5.3 % — SIGNIFICANT CHANGE UP (ref 4–5.6)
ALBUMIN SERPL ELPH-MCNC: 3.8 G/DL — SIGNIFICANT CHANGE UP (ref 3.3–5.2)
ALP SERPL-CCNC: 80 U/L — SIGNIFICANT CHANGE UP (ref 40–120)
ALT FLD-CCNC: 18 U/L — SIGNIFICANT CHANGE UP
ANION GAP SERPL CALC-SCNC: 14 MMOL/L — SIGNIFICANT CHANGE UP (ref 5–17)
AST SERPL-CCNC: 29 U/L — SIGNIFICANT CHANGE UP
BASOPHILS # BLD AUTO: 0 K/UL — SIGNIFICANT CHANGE UP (ref 0–0.2)
BASOPHILS NFR BLD AUTO: 0 % — SIGNIFICANT CHANGE UP (ref 0–2)
BILIRUB SERPL-MCNC: 0.4 MG/DL — SIGNIFICANT CHANGE UP (ref 0.4–2)
BUN SERPL-MCNC: 18.3 MG/DL — SIGNIFICANT CHANGE UP (ref 8–20)
BURR CELLS BLD QL SMEAR: PRESENT — SIGNIFICANT CHANGE UP
CALCIUM SERPL-MCNC: 8.9 MG/DL — SIGNIFICANT CHANGE UP (ref 8.6–10.2)
CHLORIDE SERPL-SCNC: 95 MMOL/L — LOW (ref 98–107)
CHOLEST SERPL-MCNC: 196 MG/DL — SIGNIFICANT CHANGE UP
CO2 SERPL-SCNC: 32 MMOL/L — HIGH (ref 22–29)
CREAT SERPL-MCNC: 1.26 MG/DL — SIGNIFICANT CHANGE UP (ref 0.5–1.3)
EGFR: 39 ML/MIN/1.73M2 — LOW
EOSINOPHIL # BLD AUTO: 0 K/UL — SIGNIFICANT CHANGE UP (ref 0–0.5)
EOSINOPHIL NFR BLD AUTO: 0 % — SIGNIFICANT CHANGE UP (ref 0–6)
ESTIMATED AVERAGE GLUCOSE: 105 MG/DL — SIGNIFICANT CHANGE UP (ref 68–114)
GAS PNL BLDA: SIGNIFICANT CHANGE UP
GIANT PLATELETS BLD QL SMEAR: PRESENT — SIGNIFICANT CHANGE UP
GLUCOSE SERPL-MCNC: 178 MG/DL — HIGH (ref 70–99)
HCT VFR BLD CALC: 46.8 % — HIGH (ref 34.5–45)
HDLC SERPL-MCNC: 75 MG/DL — SIGNIFICANT CHANGE UP
HGB BLD-MCNC: 14.4 G/DL — SIGNIFICANT CHANGE UP (ref 11.5–15.5)
HYPOCHROMIA BLD QL: SLIGHT — SIGNIFICANT CHANGE UP
INR BLD: 1.01 RATIO — SIGNIFICANT CHANGE UP (ref 0.88–1.16)
LIPID PNL WITH DIRECT LDL SERPL: 109 MG/DL — HIGH
LYMPHOCYTES # BLD AUTO: 0.17 K/UL — LOW (ref 1–3.3)
LYMPHOCYTES # BLD AUTO: 6.5 % — LOW (ref 13–44)
MANUAL SMEAR VERIFICATION: SIGNIFICANT CHANGE UP
MCHC RBC-ENTMCNC: 27.9 PG — SIGNIFICANT CHANGE UP (ref 27–34)
MCHC RBC-ENTMCNC: 30.8 GM/DL — LOW (ref 32–36)
MCV RBC AUTO: 90.7 FL — SIGNIFICANT CHANGE UP (ref 80–100)
MONOCYTES # BLD AUTO: 0 K/UL — SIGNIFICANT CHANGE UP (ref 0–0.9)
MONOCYTES NFR BLD AUTO: 0 % — LOW (ref 2–14)
NEUTROPHILS # BLD AUTO: 2.5 K/UL — SIGNIFICANT CHANGE UP (ref 1.8–7.4)
NEUTROPHILS NFR BLD AUTO: 92.6 % — HIGH (ref 43–77)
NEUTS BAND # BLD: 0.5 % — SIGNIFICANT CHANGE UP (ref 0–8)
NON HDL CHOLESTEROL: 121 MG/DL — SIGNIFICANT CHANGE UP
OVALOCYTES BLD QL SMEAR: SIGNIFICANT CHANGE UP
PLAT MORPH BLD: NORMAL — SIGNIFICANT CHANGE UP
PLATELET # BLD AUTO: 137 K/UL — LOW (ref 150–400)
POIKILOCYTOSIS BLD QL AUTO: SIGNIFICANT CHANGE UP
POLYCHROMASIA BLD QL SMEAR: SLIGHT — SIGNIFICANT CHANGE UP
POTASSIUM SERPL-MCNC: 4.5 MMOL/L — SIGNIFICANT CHANGE UP (ref 3.5–5.3)
POTASSIUM SERPL-SCNC: 4.5 MMOL/L — SIGNIFICANT CHANGE UP (ref 3.5–5.3)
PROCALCITONIN SERPL-MCNC: 0.15 NG/ML — HIGH (ref 0.02–0.1)
PROT SERPL-MCNC: 7 G/DL — SIGNIFICANT CHANGE UP (ref 6.6–8.7)
PROTHROM AB SERPL-ACNC: 11.7 SEC — SIGNIFICANT CHANGE UP (ref 10.5–13.4)
RBC # BLD: 5.16 M/UL — SIGNIFICANT CHANGE UP (ref 3.8–5.2)
RBC # FLD: 13.9 % — SIGNIFICANT CHANGE UP (ref 10.3–14.5)
RBC BLD AUTO: ABNORMAL
SMUDGE CELLS # BLD: PRESENT — SIGNIFICANT CHANGE UP
SODIUM SERPL-SCNC: 141 MMOL/L — SIGNIFICANT CHANGE UP (ref 135–145)
TRIGL SERPL-MCNC: 62 MG/DL — SIGNIFICANT CHANGE UP
TROPONIN T SERPL-MCNC: 0.02 NG/ML — SIGNIFICANT CHANGE UP (ref 0–0.06)
TSH SERPL-MCNC: 0.66 UIU/ML — SIGNIFICANT CHANGE UP (ref 0.27–4.2)
VARIANT LYMPHS # BLD: 0.4 % — SIGNIFICANT CHANGE UP (ref 0–6)
WBC # BLD: 2.68 K/UL — LOW (ref 3.8–10.5)
WBC # FLD AUTO: 2.68 K/UL — LOW (ref 3.8–10.5)

## 2022-06-25 PROCEDURE — 99222 1ST HOSP IP/OBS MODERATE 55: CPT | Mod: FS

## 2022-06-25 PROCEDURE — 93306 TTE W/DOPPLER COMPLETE: CPT | Mod: 26

## 2022-06-25 PROCEDURE — 99233 SBSQ HOSP IP/OBS HIGH 50: CPT

## 2022-06-25 RX ORDER — ENOXAPARIN SODIUM 100 MG/ML
40 INJECTION SUBCUTANEOUS EVERY 24 HOURS
Refills: 0 | Status: DISCONTINUED | OUTPATIENT
Start: 2022-06-25 | End: 2022-06-30

## 2022-06-25 RX ORDER — SODIUM CHLORIDE 9 MG/ML
500 INJECTION INTRAMUSCULAR; INTRAVENOUS; SUBCUTANEOUS ONCE
Refills: 0 | Status: COMPLETED | OUTPATIENT
Start: 2022-06-25 | End: 2022-06-26

## 2022-06-25 RX ORDER — FUROSEMIDE 40 MG
40 TABLET ORAL DAILY
Refills: 0 | Status: DISCONTINUED | OUTPATIENT
Start: 2022-06-25 | End: 2022-06-25

## 2022-06-25 RX ORDER — IPRATROPIUM/ALBUTEROL SULFATE 18-103MCG
3 AEROSOL WITH ADAPTER (GRAM) INHALATION EVERY 6 HOURS
Refills: 0 | Status: DISCONTINUED | OUTPATIENT
Start: 2022-06-25 | End: 2022-06-26

## 2022-06-25 RX ORDER — DONEPEZIL HYDROCHLORIDE 10 MG/1
10 TABLET, FILM COATED ORAL AT BEDTIME
Refills: 0 | Status: DISCONTINUED | OUTPATIENT
Start: 2022-06-25 | End: 2022-06-26

## 2022-06-25 RX ORDER — METOPROLOL TARTRATE 50 MG
25 TABLET ORAL
Refills: 0 | Status: DISCONTINUED | OUTPATIENT
Start: 2022-06-25 | End: 2022-06-26

## 2022-06-25 RX ORDER — ASPIRIN/CALCIUM CARB/MAGNESIUM 324 MG
81 TABLET ORAL DAILY
Refills: 0 | Status: DISCONTINUED | OUTPATIENT
Start: 2022-06-25 | End: 2022-06-30

## 2022-06-25 RX ORDER — METOPROLOL TARTRATE 50 MG
2.5 TABLET ORAL ONCE
Refills: 0 | Status: COMPLETED | OUTPATIENT
Start: 2022-06-25 | End: 2022-06-25

## 2022-06-25 RX ORDER — FUROSEMIDE 40 MG
20 TABLET ORAL DAILY
Refills: 0 | Status: DISCONTINUED | OUTPATIENT
Start: 2022-06-25 | End: 2022-06-26

## 2022-06-25 RX ORDER — METOPROLOL TARTRATE 50 MG
5 TABLET ORAL ONCE
Refills: 0 | Status: COMPLETED | OUTPATIENT
Start: 2022-06-25 | End: 2022-06-25

## 2022-06-25 RX ORDER — MEMANTINE HYDROCHLORIDE 10 MG/1
10 TABLET ORAL AT BEDTIME
Refills: 0 | Status: DISCONTINUED | OUTPATIENT
Start: 2022-06-25 | End: 2022-06-26

## 2022-06-25 RX ADMIN — Medication 3 MILLILITER(S): at 15:16

## 2022-06-25 RX ADMIN — Medication 20 MILLIGRAM(S): at 14:20

## 2022-06-25 RX ADMIN — Medication 3 MILLILITER(S): at 21:02

## 2022-06-25 RX ADMIN — ENOXAPARIN SODIUM 40 MILLIGRAM(S): 100 INJECTION SUBCUTANEOUS at 06:11

## 2022-06-25 RX ADMIN — Medication 2.5 MILLIGRAM(S): at 20:14

## 2022-06-25 RX ADMIN — Medication 40 MILLIGRAM(S): at 05:40

## 2022-06-25 RX ADMIN — Medication 3 MILLILITER(S): at 03:49

## 2022-06-25 RX ADMIN — Medication 5 MILLIGRAM(S): at 06:35

## 2022-06-25 RX ADMIN — Medication 40 MILLIGRAM(S): at 18:33

## 2022-06-25 NOTE — H&P ADULT - NSHPPHYSICALEXAM_GEN_ALL_CORE
Vital Signs Last 24 Hrs  T(C): 36.8 (24 Jun 2022 23:52), Max: 36.8 (24 Jun 2022 22:25)  T(F): 98.2 (24 Jun 2022 23:52), Max: 98.2 (24 Jun 2022 22:25)  HR: 101 (24 Jun 2022 23:52) (87 - 204)  BP: 117/66 (24 Jun 2022 23:52) (77/52 - 175/90)  BP(mean): --  RR: 18 (24 Jun 2022 23:52) (18 - 26)  SpO2: 93% (24 Jun 2022 23:52) (87% - 100%)      PHYSICAL EXAM:  GENERAL: on NRM  HEAD:  Atraumatic, Normocephalic  EYES: EOMI, PERRLA, conjunctiva and sclera clear  NECK: Supple, No JVD  CHEST/LUNG: b/l rhonchi, more on left, no wheeze   HEART: iRegular rate and rhythm; No murmurs, rubs, or gallops  ABDOMEN: Soft, Nontender, Nondistended; Bowel sounds present  EXTREMITIES:  2+ Peripheral Pulses, No clubbing, cyanosis, or edema  PSYCH: AAOx3  NEUROLOGY: non-focal  SKIN: No rashes or lesions

## 2022-06-25 NOTE — H&P ADULT - CONVERSATION DETAILS
DNR/DNI   molst done in prior admission  confirmed with daughter today who is a health care surrogate DNR/DNI   molst done in prior admission, she forgot to bring the MOLST form today, will bring the form tomorrow.     discussed and confirmed again DNR/DNI

## 2022-06-25 NOTE — H&P ADULT - ASSESSMENT
Pt unable to provide history, therefore was obtained from daughter.  patient is a 89 yo female with PMH of Dementia, COPD, dCHF, HTn was brought to the  for SOB for 4-5 days worse today. In the ED, patietn was found hypoxic and afib with rvr. cough+, edema-, fever-.    Impression:    Acute Hypoxic respiratory failure  chronic hypercapnic respiratory failure  COPD  Viral PNA:    s/p duonebs in ed  start on solumedrol 40 iv daily  repeat ABG-compensated chronic resp acidosis  switch NRM to venti mask, will switch to HFNC if remains hypoxic on venti  patient is a DNR/DNI  cultures sent  send procal  received vanc zosyn in ed  will hold off further abx, f/u with culture and procal      Afib with rvr  s/p iv metoprolol  rate controlled now  lopressor bid  echo  cardiology  troponin    DCHF exacerbation:  likely due to afib rvr  continue lasix 40 iv daily  repeat cxr in am  cardiology   echo    dementia:  continue meds      DNR/DNI

## 2022-06-25 NOTE — PROGRESS NOTE ADULT - ASSESSMENT
Pt unable to provide history, therefore was obtained from daughter, 89 yo female with PMH of Dementia, COPD, dCHF, HTn was brought to the ws for SOB for 4-5 days got worse  In the ED, patietn was found hypoxic and afib with rvr. cough+, edema-, fever, her heart rate is better now, NSR, her ABG done showed hypercapnia respiratory failure due to COPd exacerbation and Acute congestive heart failure, in setting of Viral Pneumonia.     Impression:    Acute Hypoxic hypercapnic respiratory failure due to COPD exacerbation and viral Pneumonia:   started on solumedrol 40 iv daily, will change to bid,   repeat ABG-compensated chronic resp acidosis  switched NRM to venti mask, will switch to HFNC if remains hypoxic on venti  patient is a DNR/DNI  cultures sent will follow  procal 0.15  received vanc zosyn in ed, will hold off further abx,   will provide supportive  and close monitoring.       Afib with rvr  s/p iv metoprolol  rate controlled   lopressor bid  converted to NSR now  BETO Pending  Further to decided anticoagulation give over all her situation  Trops negative  TSH normal      DCHF exacerbation:  likely due to afib rvr  got lasix IV pushes, her mucous membranes looks dry and her creatinine is little up  repeat cxr looks same as previous  will monitor closely, will follow TTE    dementia:  continue meds, daughter to bring her home meds       DVT prophylaxis: Lovenox sc     Patient is DNR/DNI    Plan of care discussed with daughter at bedside      Pt unable to provide history, therefore was obtained from daughter, 89 yo female with PMH of Dementia, COPD, dCHF, HTn was brought to the ws for SOB for 4-5 days got worse  In the ED, patietn was found hypoxic and afib with rvr. cough+, edema-, fever, her heart rate is better now, NSR, her ABG done showed hypercapnia respiratory failure due to COPd exacerbation and Acute congestive heart failure, in setting of Viral Pneumonia.     Impression:    Acute Hypoxic hypercapnic respiratory failure due to COPD exacerbation and viral Pneumonia:   started on solumedrol 40 iv daily, will change to bid,   repeat ABG-compensated chronic resp acidosis  switched NRM to venti mask, will switch to HFNC if remains hypoxic on venti  patient is a DNR/DNI  cultures sent will follow  procal 0.15  received vanc zosyn in ed, will hold off further abx,   will provide supportive  and close monitoring.       Afib with rvr  s/p iv metoprolol  rate controlled   lopressor bid  converted to NSR now  TTE Pending  Further to decided anticoagulation give over all her situation  Trops negative  TSH normal      DCHF exacerbation:  likely due to afib rvr  got lasix IV pushes, her mucous membranes looks dry and her creatinine is little up  repeat cxr looks same as previous  will monitor closely, will follow TTE    dementia:  continue meds, daughter to bring her home meds       DVT prophylaxis: Lovenox sc     Patient is DNR/DNI    Plan of care discussed with daughter at bedside

## 2022-06-25 NOTE — H&P ADULT - HISTORY OF PRESENT ILLNESS
Pt unable to provide history, therefore was obtained from daughter.  patient is a 89 yo female with PMH of Dementia, COPD, dCHF, HTn was brought to the ws for SOB for 4-5 days worse today. In the ED, patietn was found hypoxic and afib with rvr. cough+, edema-, fever-.    ROS: negative except noted above

## 2022-06-26 LAB
-  COAGULASE NEGATIVE STAPHYLOCOCCUS, METHICILLIN RESISTANT: SIGNIFICANT CHANGE UP
ALBUMIN SERPL ELPH-MCNC: 4.1 G/DL — SIGNIFICANT CHANGE UP (ref 3.3–5.2)
ALP SERPL-CCNC: 73 U/L — SIGNIFICANT CHANGE UP (ref 40–120)
ALT FLD-CCNC: 17 U/L — SIGNIFICANT CHANGE UP
ANION GAP SERPL CALC-SCNC: 8 MMOL/L — SIGNIFICANT CHANGE UP (ref 5–17)
APTT BLD: 31.2 SEC — SIGNIFICANT CHANGE UP (ref 27.5–35.5)
AST SERPL-CCNC: 21 U/L — SIGNIFICANT CHANGE UP
BILIRUB SERPL-MCNC: 0.4 MG/DL — SIGNIFICANT CHANGE UP (ref 0.4–2)
BUN SERPL-MCNC: 29.7 MG/DL — HIGH (ref 8–20)
CALCIUM SERPL-MCNC: 9.1 MG/DL — SIGNIFICANT CHANGE UP (ref 8.6–10.2)
CHLORIDE SERPL-SCNC: 96 MMOL/L — LOW (ref 98–107)
CO2 SERPL-SCNC: 39 MMOL/L — HIGH (ref 22–29)
CREAT SERPL-MCNC: 1.24 MG/DL — SIGNIFICANT CHANGE UP (ref 0.5–1.3)
CULTURE RESULTS: NO GROWTH — SIGNIFICANT CHANGE UP
CULTURE RESULTS: SIGNIFICANT CHANGE UP
EGFR: 40 ML/MIN/1.73M2 — LOW
GLUCOSE SERPL-MCNC: 129 MG/DL — HIGH (ref 70–99)
GRAM STN FLD: SIGNIFICANT CHANGE UP
HCT VFR BLD CALC: 45.4 % — HIGH (ref 34.5–45)
HGB BLD-MCNC: 13.4 G/DL — SIGNIFICANT CHANGE UP (ref 11.5–15.5)
INR BLD: 0.99 RATIO — SIGNIFICANT CHANGE UP (ref 0.88–1.16)
MAGNESIUM SERPL-MCNC: 1.9 MG/DL — SIGNIFICANT CHANGE UP (ref 1.6–2.6)
MCHC RBC-ENTMCNC: 27.2 PG — SIGNIFICANT CHANGE UP (ref 27–34)
MCHC RBC-ENTMCNC: 29.5 GM/DL — LOW (ref 32–36)
MCV RBC AUTO: 92.1 FL — SIGNIFICANT CHANGE UP (ref 80–100)
METHOD TYPE: SIGNIFICANT CHANGE UP
ORGANISM # SPEC MICROSCOPIC CNT: SIGNIFICANT CHANGE UP
ORGANISM # SPEC MICROSCOPIC CNT: SIGNIFICANT CHANGE UP
PHOSPHATE SERPL-MCNC: 4.8 MG/DL — HIGH (ref 2.4–4.7)
PLATELET # BLD AUTO: 158 K/UL — SIGNIFICANT CHANGE UP (ref 150–400)
POTASSIUM SERPL-MCNC: 4.2 MMOL/L — SIGNIFICANT CHANGE UP (ref 3.5–5.3)
POTASSIUM SERPL-SCNC: 4.2 MMOL/L — SIGNIFICANT CHANGE UP (ref 3.5–5.3)
PROT SERPL-MCNC: 6.9 G/DL — SIGNIFICANT CHANGE UP (ref 6.6–8.7)
PROTHROM AB SERPL-ACNC: 11.5 SEC — SIGNIFICANT CHANGE UP (ref 10.5–13.4)
RBC # BLD: 4.93 M/UL — SIGNIFICANT CHANGE UP (ref 3.8–5.2)
RBC # FLD: 13.6 % — SIGNIFICANT CHANGE UP (ref 10.3–14.5)
SODIUM SERPL-SCNC: 143 MMOL/L — SIGNIFICANT CHANGE UP (ref 135–145)
SPECIMEN SOURCE: SIGNIFICANT CHANGE UP
SPECIMEN SOURCE: SIGNIFICANT CHANGE UP
TROPONIN T SERPL-MCNC: 0.03 NG/ML — SIGNIFICANT CHANGE UP (ref 0–0.06)
VANCOMYCIN TROUGH SERPL-MCNC: 7.2 UG/ML — LOW (ref 10–20)
WBC # BLD: 7.78 K/UL — SIGNIFICANT CHANGE UP (ref 3.8–10.5)
WBC # FLD AUTO: 7.78 K/UL — SIGNIFICANT CHANGE UP (ref 3.8–10.5)

## 2022-06-26 PROCEDURE — 71045 X-RAY EXAM CHEST 1 VIEW: CPT | Mod: 26

## 2022-06-26 PROCEDURE — 99232 SBSQ HOSP IP/OBS MODERATE 35: CPT | Mod: FS

## 2022-06-26 PROCEDURE — 12345: CPT | Mod: NC

## 2022-06-26 PROCEDURE — 99233 SBSQ HOSP IP/OBS HIGH 50: CPT

## 2022-06-26 RX ORDER — METOPROLOL TARTRATE 50 MG
5 TABLET ORAL ONCE
Refills: 0 | Status: COMPLETED | OUTPATIENT
Start: 2022-06-26 | End: 2022-06-26

## 2022-06-26 RX ORDER — PANTOPRAZOLE SODIUM 20 MG/1
40 TABLET, DELAYED RELEASE ORAL
Refills: 0 | Status: DISCONTINUED | OUTPATIENT
Start: 2022-06-26 | End: 2022-06-30

## 2022-06-26 RX ORDER — SODIUM CHLORIDE 9 MG/ML
500 INJECTION INTRAMUSCULAR; INTRAVENOUS; SUBCUTANEOUS ONCE
Refills: 0 | Status: COMPLETED | OUTPATIENT
Start: 2022-06-26 | End: 2022-06-26

## 2022-06-26 RX ORDER — FUROSEMIDE 40 MG
20 TABLET ORAL DAILY
Refills: 0 | Status: DISCONTINUED | OUTPATIENT
Start: 2022-06-26 | End: 2022-06-27

## 2022-06-26 RX ORDER — METOPROLOL TARTRATE 50 MG
50 TABLET ORAL
Refills: 0 | Status: DISCONTINUED | OUTPATIENT
Start: 2022-06-26 | End: 2022-06-26

## 2022-06-26 RX ORDER — DIGOXIN 250 MCG
125 TABLET ORAL ONCE
Refills: 0 | Status: COMPLETED | OUTPATIENT
Start: 2022-06-26 | End: 2022-06-26

## 2022-06-26 RX ORDER — VANCOMYCIN HCL 1 G
750 VIAL (EA) INTRAVENOUS ONCE
Refills: 0 | Status: DISCONTINUED | OUTPATIENT
Start: 2022-06-26 | End: 2022-06-26

## 2022-06-26 RX ORDER — METOPROLOL TARTRATE 50 MG
2.5 TABLET ORAL ONCE
Refills: 0 | Status: COMPLETED | OUTPATIENT
Start: 2022-06-26 | End: 2022-06-26

## 2022-06-26 RX ORDER — METOPROLOL TARTRATE 50 MG
25 TABLET ORAL
Refills: 0 | Status: DISCONTINUED | OUTPATIENT
Start: 2022-06-26 | End: 2022-06-30

## 2022-06-26 RX ORDER — LEVALBUTEROL 1.25 MG/.5ML
1.25 SOLUTION, CONCENTRATE RESPIRATORY (INHALATION) EVERY 8 HOURS
Refills: 0 | Status: COMPLETED | OUTPATIENT
Start: 2022-06-26 | End: 2022-06-29

## 2022-06-26 RX ORDER — DILTIAZEM HCL 120 MG
5 CAPSULE, EXT RELEASE 24 HR ORAL ONCE
Refills: 0 | Status: COMPLETED | OUTPATIENT
Start: 2022-06-26 | End: 2022-06-26

## 2022-06-26 RX ORDER — DILTIAZEM HCL 120 MG
10 CAPSULE, EXT RELEASE 24 HR ORAL ONCE
Refills: 0 | Status: COMPLETED | OUTPATIENT
Start: 2022-06-26 | End: 2022-06-26

## 2022-06-26 RX ORDER — VANCOMYCIN HCL 1 G
1000 VIAL (EA) INTRAVENOUS ONCE
Refills: 0 | Status: DISCONTINUED | OUTPATIENT
Start: 2022-06-26 | End: 2022-06-26

## 2022-06-26 RX ORDER — METOPROLOL TARTRATE 50 MG
5 TABLET ORAL EVERY 6 HOURS
Refills: 0 | Status: DISCONTINUED | OUTPATIENT
Start: 2022-06-26 | End: 2022-06-30

## 2022-06-26 RX ADMIN — Medication 20 MILLIGRAM(S): at 05:17

## 2022-06-26 RX ADMIN — Medication 125 MICROGRAM(S): at 20:18

## 2022-06-26 RX ADMIN — Medication 40 MILLIGRAM(S): at 21:58

## 2022-06-26 RX ADMIN — Medication 5 MILLIGRAM(S): at 08:23

## 2022-06-26 RX ADMIN — Medication 5 MILLIGRAM(S): at 11:42

## 2022-06-26 RX ADMIN — ENOXAPARIN SODIUM 40 MILLIGRAM(S): 100 INJECTION SUBCUTANEOUS at 05:18

## 2022-06-26 RX ADMIN — Medication 81 MILLIGRAM(S): at 17:41

## 2022-06-26 RX ADMIN — LEVALBUTEROL 1.25 MILLIGRAM(S): 1.25 SOLUTION, CONCENTRATE RESPIRATORY (INHALATION) at 16:14

## 2022-06-26 RX ADMIN — Medication 10 MILLIGRAM(S): at 18:08

## 2022-06-26 RX ADMIN — Medication 3 MILLILITER(S): at 04:05

## 2022-06-26 RX ADMIN — Medication 25 MILLIGRAM(S): at 21:58

## 2022-06-26 RX ADMIN — SODIUM CHLORIDE 500 MILLILITER(S): 9 INJECTION INTRAMUSCULAR; INTRAVENOUS; SUBCUTANEOUS at 00:11

## 2022-06-26 RX ADMIN — Medication 2.5 MILLIGRAM(S): at 01:40

## 2022-06-26 RX ADMIN — Medication 5 MILLIGRAM(S): at 21:57

## 2022-06-26 RX ADMIN — Medication 40 MILLIGRAM(S): at 05:17

## 2022-06-26 RX ADMIN — Medication 0.25 MILLIGRAM(S): at 20:18

## 2022-06-26 RX ADMIN — SODIUM CHLORIDE 500 MILLILITER(S): 9 INJECTION INTRAMUSCULAR; INTRAVENOUS; SUBCUTANEOUS at 20:00

## 2022-06-26 NOTE — PROGRESS NOTE ADULT - NS ATTEND AMEND GEN_ALL_CORE FT
Patient is a 89yo F w/ HFpEF, COPD, Alzheimer's disease, advanced dementia, HTN, admitted after experiencing ongoing shortness of breath, chest congestion and non productive cough, likely due to Viral PNA vs. acute on chronic dHF.  Patient euvolemic on exam and responded well to Lasix IV in the EDU.  History obtained via daughter by the bedside. proBNP: 2250, Trop x1 negative.     Patient seen by  me in CDU 10. Patient is awake, Patients daughter at bedside, Per daughter, patient is followed by Dr Hayes in [place].  Patient is no distress    Chest- Bilateral Clear BS  Cardiac- S1 and S2  Abdomen- soft    A/P  1. Afib- Rate control, no AC due to fall risk  2. HFpEF- improved      Patient seen and examined by me.  I have discussed my recommendation with the PA which are outlined above.  I have discussed with the Medicine PA    Will sign off

## 2022-06-26 NOTE — DIETITIAN INITIAL EVALUATION ADULT - ETIOLOGY
related to inability to meet sufficient protein-energy needs in setting of URI, acute resp fail with hypoxia, COPD, advanced age

## 2022-06-26 NOTE — PROGRESS NOTE ADULT - ASSESSMENT
Pt unable to provide history, therefore was obtained from daughter, 89 yo female with PMH of Dementia, COPD, dCHF, HTn was brought to the ws for SOB for 4-5 days got worse  In the ED, patietn was found hypoxic and afib with rvr. cough+, edema-, fever, her heart rate is better now, NSR, her ABG done showed hypercapnia respiratory failure due to COPd exacerbation and Acute congestive heart failure, in setting of Viral Pneumonia.     Impression:    Acute Hypoxic hypercapnic respiratory failure due to COPD exacerbation and viral Pneumonia:   started on solumedrol 40 iv bid, feeling better will change it to 40mg daily    repeat ABG-compensated chronic resp acidosis  switched NRM to venti mask, now to nasal cannulla, will continue to wean off slowly if does not require.  patient is a DNR/DNI  cultures sent will follow  procal 0.15  received vanc zosyn in ed, will hold off further abx,   will provide supportive  and close monitoring.       Afib with rvr: now paroxysmal going back and forth to NSR and AFIB  s/p iv metoprolol  rate controlled   lopressor bid  TTE looks ok   SHARITA VASC is 5  Further to decided anticoagulation give over all her situation  Trops negative  TSH normal      DCHF exacerbation:  likely due to afib rvr  on lasix 20mg daily, will monitor closely, cardiology is following     dementia:  continue meds, daughter to bring her home meds       DVT prophylaxis: Lovenox sc     Patient is DNR/DNI    Plan of care discussed with daughter at bedside      Pt unable to provide history, therefore was obtained from daughter, 89 yo female with PMH of Dementia, COPD, dCHF, HTn was brought to the ws for SOB for 4-5 days got worse  In the ED, latosha was found hypoxic and afib with rvr. cough+, edema-, fever, her heart rate is better now, NSR, her ABG done showed hypercapnia respiratory failure due to COPd exacerbation and Acute congestive heart failure, in setting of Viral Pneumonia.     Impression:    Acute Hypoxic hypercapnic respiratory failure due to COPD exacerbation and viral Pneumonia:   started on solumedrol 40 iv bid, feeling better will change it to 40mg daily    repeat ABG-compensated chronic resp acidosis  switched NRM to venti mask, now to nasal cannulla, will continue to wean off slowly if does not require.  patient is a DNR/DNI  cultures sent will follow  procal 0.15  received vanc zosyn in ed, will hold off further abx,   will provide supportive  and close monitoring.       Afib with rvr: now paroxysmal going back and forth to NSR and AFIB  s/p iv metoprolol  rate controlled   lopressor bid  TTE looks ok   SHARITA VASC is 5  Further to decided anticoagulation give over all her situation  Trops negative  TSH normal      DCHF exacerbation:  likely due to afib rvr  on lasix 20mg daily, will monitor closely, cardiology is following     dementia:  continue meds, daughter to bring her home meds     coag negative staph in 1 bottle, other bottle is negative, repeat cultures done, will follow off antibiotics, no leucocytosis, no fever spike.     DVT prophylaxis: Lovenox sc     Patient is DNR/DNI    Plan of care discussed with daughter at bedside

## 2022-06-26 NOTE — DIETITIAN INITIAL EVALUATION ADULT - OTHER INFO
89 yo female with PMH of Dementia, COPD, dCHF, HTn was brought to the ws for SOB for 4-5 days worse today. In the ED, patient was found hypoxic and afib with rvr. cough+, edema-, fever-. Pt admitted with acute resp failure 2/2 viral URI with cough.

## 2022-06-26 NOTE — PROGRESS NOTE ADULT - ASSESSMENT
89yo F w/ PMHx of CHF, COPD, Alzheimer's disease, advanced dementia, HTN, admitted after patient developed shortness of breath.  As per daughter mom waas at a concert last Saturday and AC was on, since then not feeling well, has chest congestion and shortness of breath.  Gave mom expectorants w/o full resolution.  Mom also has cough for four days and mild dyspnea.  History obtained through daughter by the bedside.  Brought her in for evaluation today because symptoms remained same.  Denies CP, palpitations, fever, chills, N/V or diaphoresis.

## 2022-06-26 NOTE — DIETITIAN INITIAL EVALUATION ADULT - PERTINENT MEDS FT
MEDICATIONS  (STANDING):  aspirin  chewable 81 milliGRAM(s) Oral daily  enoxaparin Injectable 40 milliGRAM(s) SubCutaneous every 24 hours  furosemide   Injectable 20 milliGRAM(s) IV Push daily  levalbuterol Inhalation 1.25 milliGRAM(s) Inhalation every 8 hours  methylPREDNISolone sodium succinate Injectable 40 milliGRAM(s) IV Push daily  metoprolol tartrate 25 milliGRAM(s) Oral two times a day  Namzaric ER 28mg-10mg  CAPSULE 1 Capsule(s) 1 Capsule(s) Oral at bedtime  pantoprazole    Tablet 40 milliGRAM(s) Oral before breakfast    MEDICATIONS  (PRN):  acetaminophen     Tablet .. 650 milliGRAM(s) Oral every 6 hours PRN Temp greater or equal to 38C (100.4F), Mild Pain (1 - 3)  metoprolol tartrate Injectable 5 milliGRAM(s) IV Push every 6 hours PRN if HR >120  ondansetron Injectable 4 milliGRAM(s) IV Push every 8 hours PRN Nausea and/or Vomiting

## 2022-06-26 NOTE — CHART NOTE - NSCHARTNOTEFT_GEN_A_CORE
89 yo female hx dementia, COPD, dCHF, HTn admitted with COPD exacerbation, Acute congestive heart failure, and Viral Pneumonia. Also +AFIB    Called by RN, Blood cultures collected on 6/24 are now positive for gram positive cocci in clusters   Procal and CX collected on admission for +SIRS criteria  Patient's most recent WBC 2.68   Currently Afebrile   Will repeat blood cx x2 89 yo female hx dementia, COPD, dCHF, HTn admitted with COPD exacerbation, Acute congestive heart failure, and Viral Pneumonia. Also +AFIB    Called by RN, Blood cultures collected on 6/24 are now positive for gram positive cocci in clusters   Procal and blood CX collected on admission for +SIRS criteria  Patient's most recent WBC 2.68   Currently Afebrile   Will repeat blood cx x2, CXR, and UA   Will order one time dose of Vanco 1g

## 2022-06-26 NOTE — PROGRESS NOTE ADULT - PROBLEM SELECTOR PLAN 2
.  - [id number]= 5  - Afib 110s  - no AC due to risk of fall and bleed, monitor daily CBC, rate control w/ BB  - likely more rapid due to underlying illness  - continue metoprolol as above  - continue lovenox for VTE prophylaxis .  - [id number]= 5  - Afib 110s  - no AC due to risk of fall and bleed, monitor daily CBC, rate control w/ BB  - likely more rapid due to underlying illness  - continue metoprolol as above  - continue Lovenox for VTE prophylaxis

## 2022-06-26 NOTE — PROGRESS NOTE ADULT - PROBLEM SELECTOR PLAN 3
.  - continue BB .  - continue BB      No further inpatient cardiac work up at this time.  Will sign off.  Please reconsult if needed. .  - continue BB    Plan discussed with Dr. Vega and Medicine SHERRILL Lopez    No further inpatient cardiac work up at this time.  Will sign off.  Please reconsult if needed.

## 2022-06-26 NOTE — DIETITIAN INITIAL EVALUATION ADULT - ADD RECOMMEND
Family refusing nutrition supplements; Rx MVI and vit C 500mg daily; Encourage HBV protein sources, small frequent meals

## 2022-06-26 NOTE — CHART NOTE - NSCHARTNOTEFT_GEN_A_CORE
Patient reevaluated for Afib with RVR rate went upto 180s and 190s, patient is sitting comfortable, some what confused, not in respiratory distress, require 3 iters of O2 with nasal cannula,   recent vital checked HR is 153, she was give Cardizem IV push, cardiology singed off  from the case,  Cardiology was called again to see if patient need to be started on Digoxin because of BP systolic ~90, would not be able to tolerate increasing dose of BB or Cardizem, will follow cardiology, will further discuss with cardiology about anticoagulation as she has no Hx of bleeding problems and will also discuss with daughter who is health care proxy and very much involved.   for her Delirium and confusion, will reoriented and will give ativan PRN small dose.

## 2022-06-26 NOTE — DIETITIAN INITIAL EVALUATION ADULT - PERTINENT LABORATORY DATA
06-26    143  |  96<L>  |  29.7<H>  ----------------------------<  129<H>  4.2   |  39.0<H>  |  1.24    Ca    9.1      26 Jun 2022 07:45  Phos  4.8     06-26  Mg     1.9     06-26    TPro  6.9  /  Alb  4.1  /  TBili  0.4  /  DBili  x   /  AST  21  /  ALT  17  /  AlkPhos  73  06-26  A1C with Estimated Average Glucose Result: 5.3 % (06-25-22 @ 07:18)

## 2022-06-26 NOTE — CHART NOTE - NSCHARTNOTEFT_GEN_A_CORE
Patient pulling off O2 mask and pulling at lines  Patient seen at bedside, appears restless   VSS, NAD   Patient needs constant redirection   Constant observation ordered for medical management

## 2022-06-26 NOTE — CHART NOTE - NSCHARTNOTEFT_GEN_A_CORE
Discussed with cardiology earlier on r.e AFIB and the optimal management. AFIB w/RVR 160s, [id number]= 5,  no AC-- risk of bleed, on BBB PO and PRN IVP. Cadiology NP Monique And Dr. Vega made aware.  Rapid rate is thought to be due to underlying illness- Viral PNA, COPD. No further inpatient cardiac work up is recommended.  At this time cardiology is signing off.

## 2022-06-26 NOTE — DIETITIAN INITIAL EVALUATION ADULT - ORAL INTAKE PTA/DIET HISTORY
Per daughter at bedside Pt with very good PO intake at meals, denies trouble chewing/swallowing. Daughter states Pt weight 6mo ago 114lbs, current weight 107 stated. Unclear accuracy of admission weight, will continue to monitor. Discussed high calorie/protein tips.

## 2022-06-26 NOTE — PROGRESS NOTE ADULT - PROBLEM SELECTOR PLAN 1
.  - SOB secondary to combined etiology of diastolic HFpEF and Viral Pna and COPD  - clinically euvolemic on exam today, lungs diminished, no edema, no JVD  - also with + BCx and + Rhinovirus  - treat underlying causes of SOB  - TTE LVEF 60-65%, diastolic dysfunction, normal systolic function, mild-mod AV regurg and stenosis  GDMT       Beta Blocker: metoprolol 25mg PO BID, increase as BP/HR tolerates       Diuretic: change lasix to 20mg PO daily, gentle diuresis, patient meets SIRs criteria  Strict I&O's  Daily standing weights (if able).  Keep K > 4, Mg > 2.    Monitor renal function with ongoing diuresis. .  - SOB secondary to combined etiology of diastolic HFpEF and Viral Pna and COPD  - clinically euvolemic on exam today, lungs diminished, no edema, no JVD  - also with + BCx and + Rhinovirus  - treat underlying causes of SOB  - TTE LVEF 60-65%, diastolic dysfunction, normal systolic function, mild-mod AV regurg and stenosis  GDMT       Beta Blocker: metoprolol 25mg PO BID, increase as BP/HR tolerates       Diuretic: change lasix to 20mg PO daily, gentle diuresis, patient meets SIRs criteria and should not be overdiuresed  Strict I&O's  Daily standing weights (if able).  Keep K > 4, Mg > 2.    Monitor renal function with ongoing diuresis.

## 2022-06-26 NOTE — DIETITIAN NUTRITION RISK NOTIFICATION - ADDITIONAL COMMENTS/DIETITIAN RECOMMENDATIONS
1) Consider liberalize diet to regular, Family refusing nutrition supplements  2) Rx MVI and vit C 500mg daily  3) Encourage HBV protein sources, small frequent meals  4) Monitor weights daily for trend/accuracy  No place to stay

## 2022-06-27 LAB
24R-OH-CALCIDIOL SERPL-MCNC: 36.2 NG/ML — SIGNIFICANT CHANGE UP (ref 30–80)
ALBUMIN SERPL ELPH-MCNC: 3.7 G/DL — SIGNIFICANT CHANGE UP (ref 3.3–5.2)
ALP SERPL-CCNC: 69 U/L — SIGNIFICANT CHANGE UP (ref 40–120)
ALT FLD-CCNC: 16 U/L — SIGNIFICANT CHANGE UP
ANION GAP SERPL CALC-SCNC: 8 MMOL/L — SIGNIFICANT CHANGE UP (ref 5–17)
AST SERPL-CCNC: 23 U/L — SIGNIFICANT CHANGE UP
BILIRUB SERPL-MCNC: 0.4 MG/DL — SIGNIFICANT CHANGE UP (ref 0.4–2)
BUN SERPL-MCNC: 41.5 MG/DL — HIGH (ref 8–20)
CALCIUM SERPL-MCNC: 8.9 MG/DL — SIGNIFICANT CHANGE UP (ref 8.6–10.2)
CHLORIDE SERPL-SCNC: 95 MMOL/L — LOW (ref 98–107)
CO2 SERPL-SCNC: 34 MMOL/L — HIGH (ref 22–29)
CREAT SERPL-MCNC: 1.15 MG/DL — SIGNIFICANT CHANGE UP (ref 0.5–1.3)
EGFR: 44 ML/MIN/1.73M2 — LOW
GLUCOSE BLDC GLUCOMTR-MCNC: 125 MG/DL — HIGH (ref 70–99)
GLUCOSE SERPL-MCNC: 127 MG/DL — HIGH (ref 70–99)
HCT VFR BLD CALC: 43.6 % — SIGNIFICANT CHANGE UP (ref 34.5–45)
HGB BLD-MCNC: 13.2 G/DL — SIGNIFICANT CHANGE UP (ref 11.5–15.5)
MCHC RBC-ENTMCNC: 27.8 PG — SIGNIFICANT CHANGE UP (ref 27–34)
MCHC RBC-ENTMCNC: 30.3 GM/DL — LOW (ref 32–36)
MCV RBC AUTO: 91.8 FL — SIGNIFICANT CHANGE UP (ref 80–100)
PLATELET # BLD AUTO: 131 K/UL — LOW (ref 150–400)
POTASSIUM SERPL-MCNC: 4.8 MMOL/L — SIGNIFICANT CHANGE UP (ref 3.5–5.3)
POTASSIUM SERPL-SCNC: 4.8 MMOL/L — SIGNIFICANT CHANGE UP (ref 3.5–5.3)
PROT SERPL-MCNC: 6.6 G/DL — SIGNIFICANT CHANGE UP (ref 6.6–8.7)
RBC # BLD: 4.75 M/UL — SIGNIFICANT CHANGE UP (ref 3.8–5.2)
RBC # FLD: 13.6 % — SIGNIFICANT CHANGE UP (ref 10.3–14.5)
SODIUM SERPL-SCNC: 137 MMOL/L — SIGNIFICANT CHANGE UP (ref 135–145)
WBC # BLD: 8.36 K/UL — SIGNIFICANT CHANGE UP (ref 3.8–10.5)
WBC # FLD AUTO: 8.36 K/UL — SIGNIFICANT CHANGE UP (ref 3.8–10.5)

## 2022-06-27 PROCEDURE — 99232 SBSQ HOSP IP/OBS MODERATE 35: CPT

## 2022-06-27 PROCEDURE — 99232 SBSQ HOSP IP/OBS MODERATE 35: CPT | Mod: 25

## 2022-06-27 RX ADMIN — Medication 40 MILLIGRAM(S): at 06:25

## 2022-06-27 RX ADMIN — Medication 25 MILLIGRAM(S): at 06:27

## 2022-06-27 RX ADMIN — Medication 20 MILLIGRAM(S): at 06:26

## 2022-06-27 RX ADMIN — PANTOPRAZOLE SODIUM 40 MILLIGRAM(S): 20 TABLET, DELAYED RELEASE ORAL at 06:26

## 2022-06-27 RX ADMIN — Medication 81 MILLIGRAM(S): at 13:23

## 2022-06-27 RX ADMIN — ENOXAPARIN SODIUM 40 MILLIGRAM(S): 100 INJECTION SUBCUTANEOUS at 06:25

## 2022-06-27 RX ADMIN — LEVALBUTEROL 1.25 MILLIGRAM(S): 1.25 SOLUTION, CONCENTRATE RESPIRATORY (INHALATION) at 15:26

## 2022-06-27 RX ADMIN — LEVALBUTEROL 1.25 MILLIGRAM(S): 1.25 SOLUTION, CONCENTRATE RESPIRATORY (INHALATION) at 05:01

## 2022-06-27 NOTE — PROGRESS NOTE ADULT - PROBLEM SELECTOR PLAN 2
.  - [id number]= 5  - Afib 110s  - no AC due to risk of fall and bleed, monitor daily CBC, rate control w/ BB  - likely more rapid due to underlying illness  - continue metoprolol as above  - continue Lovenox for VTE prophylaxis

## 2022-06-27 NOTE — PROGRESS NOTE ADULT - ASSESSMENT
Pt unable to provide history, therefore was obtained from daughter, 89 yo female with PMH of Dementia, COPD, dCHF, HTn was brought to the ws for SOB for 4-5 days got worse  In the ED, latosha was found hypoxic and afib with rvr. cough+, edema-, fever, her heart rate is better now, NSR, her ABG done showed hypercapnia respiratory failure due to COPd exacerbation and Acute congestive heart failure, in setting of Viral Pneumonia.     Impression:    Acute Hypoxic hypercapnic respiratory failure due to COPD exacerbation and viral Pneumonia:   started on solumedrol 40 iv bid, feeling better will change it to 40mg daily    repeat ABG-compensated chronic resp acidosis  switched NRM to venti mask, now to nasal cannulla, will continue to wean off slowly if does not require.  patient is a DNR/DNI  cultures sent will follow  procal 0.15  received vanc zosyn in ed, will hold off further abx, repeat cultures came negative  will provide supportive  and close monitoring.   now require couple of liters of O2, will wean off slowly   Incentive spirometry       Afib with rvr: now paroxysmal going back and forth to NSR and AFIB  s/p iv metoprolol  lopressor 25mg BID   TTE looks ok   SHARITA VASC is 5  cardiology spoke with family as per them no anticoagulation in setting of high risk for fall   Trops negative  TSH normal        DCHF exacerbation:  likely due to afib rvr  looks euvolemic, will d/c lasix, will monitor closely, cardiology is following     dementia:  continue meds, daughter to bring her home meds     coag negative staph in 1 bottle, other bottle is negative, repeat cultures came negative, will follow off antibiotics, no leucocytosis, no fever spike.     DVT prophylaxis: Lovenox sc     Patient is DNR/DNI, will update MOLST form     Plan of care discussed with daughter at bedside

## 2022-06-27 NOTE — PROGRESS NOTE ADULT - ASSESSMENT
89yo F w/ PMHx of CHF, COPD, Alzheimer's disease, advanced dementia, HTN, admitted after patient developed shortness of breath.  As per daughter mom waas at a concert last Saturday and AC was on, since then not feeling well, has chest congestion and shortness of breath.  Gave mom expectorants w/o full resolution.  Mom also has cough for four days and mild dyspnea.  History obtained through daughter by the bedside.  Brought her in for evaluation today because symptoms remained same.  Denies CP, palpitations, fever, chills, N/V or diaphoresis.      Patient is a 89yo F w/ HFpEF, COPD, Alzheimer's disease, advanced dementia, HTN, admitted after experiencing ongoing shortness of breath, chest congestion and non productive cough, likely due to Viral PNA vs. acute on chronic dHF.  Patient euvolemic on exam and responded well to Lasix IV in the EDU.  History obtained via daughter by the bedside. proBNP: 2250, Trop x1 negative.     Patient seen by  me in CDU 10. Patient is awake, Patients daughter at bedside, Per daughter, patient is followed by Dr Hyaes in [place].  Patient is no distress    A/P  1. Afib- Rate control, no AC due to fall risk  2. HFpEF- improved      Again I spoke to patient's daughter regarding AC for PAF, She does not want her mother to be on AC due age, fall risk    Patient is a DNR and DNI    MEDICATIONS  (STANDING):  aspirin  chewable 81 milliGRAM(s) Oral daily  enoxaparin Injectable 40 milliGRAM(s) SubCutaneous every 24 hours  levalbuterol Inhalation 1.25 milliGRAM(s) Inhalation every 8 hours  LORazepam     Tablet 0.25 milliGRAM(s) Oral once  metoprolol tartrate 25 milliGRAM(s) Oral two times a day  Namzaric ER 28mg-10mg  CAPSULE 1 Capsule(s) 1 Capsule(s) Oral at bedtime  pantoprazole    Tablet 40 milliGRAM(s) Oral before breakfast      Discussed with Dr Carlos Multani

## 2022-06-27 NOTE — PROGRESS NOTE ADULT - PROBLEM SELECTOR PLAN 1
.  - SOB secondary to combined etiology of diastolic HFpEF and Viral Pna and COPD  - clinically euvolemic on exam today, lungs diminished, no edema, no JVD  -   - TTE LVEF 60-65%, diastolic dysfunction, normal systolic function, mild-mod AV regurg and stenosis  GDMT       Beta Blocker: metoprolol 25mg PO BID, increase as BP/HR tolerates      May d/c lasix

## 2022-06-28 ENCOUNTER — TRANSCRIPTION ENCOUNTER (OUTPATIENT)
Age: 87
End: 2022-06-28

## 2022-06-28 LAB
ALBUMIN SERPL ELPH-MCNC: 4.1 G/DL — SIGNIFICANT CHANGE UP (ref 3.3–5.2)
ALP SERPL-CCNC: 71 U/L — SIGNIFICANT CHANGE UP (ref 40–120)
ALT FLD-CCNC: 24 U/L — SIGNIFICANT CHANGE UP
ANION GAP SERPL CALC-SCNC: 12 MMOL/L — SIGNIFICANT CHANGE UP (ref 5–17)
AST SERPL-CCNC: 31 U/L — SIGNIFICANT CHANGE UP
BILIRUB SERPL-MCNC: 0.4 MG/DL — SIGNIFICANT CHANGE UP (ref 0.4–2)
BUN SERPL-MCNC: 42 MG/DL — HIGH (ref 8–20)
CALCIUM SERPL-MCNC: 10 MG/DL — SIGNIFICANT CHANGE UP (ref 8.6–10.2)
CHLORIDE SERPL-SCNC: 95 MMOL/L — LOW (ref 98–107)
CO2 SERPL-SCNC: 34 MMOL/L — HIGH (ref 22–29)
CREAT SERPL-MCNC: 1.09 MG/DL — SIGNIFICANT CHANGE UP (ref 0.5–1.3)
EGFR: 46 ML/MIN/1.73M2 — LOW
GLUCOSE SERPL-MCNC: 104 MG/DL — HIGH (ref 70–99)
GRAM STN FLD: SIGNIFICANT CHANGE UP
HCT VFR BLD CALC: 47.4 % — HIGH (ref 34.5–45)
HGB BLD-MCNC: 14.4 G/DL — SIGNIFICANT CHANGE UP (ref 11.5–15.5)
LACTATE SERPL-SCNC: 3.6 MMOL/L — HIGH (ref 0.5–2)
MCHC RBC-ENTMCNC: 27.6 PG — SIGNIFICANT CHANGE UP (ref 27–34)
MCHC RBC-ENTMCNC: 30.4 GM/DL — LOW (ref 32–36)
MCV RBC AUTO: 90.8 FL — SIGNIFICANT CHANGE UP (ref 80–100)
PLATELET # BLD AUTO: 171 K/UL — SIGNIFICANT CHANGE UP (ref 150–400)
POTASSIUM SERPL-MCNC: 4.8 MMOL/L — SIGNIFICANT CHANGE UP (ref 3.5–5.3)
POTASSIUM SERPL-SCNC: 4.8 MMOL/L — SIGNIFICANT CHANGE UP (ref 3.5–5.3)
PROT SERPL-MCNC: 7 G/DL — SIGNIFICANT CHANGE UP (ref 6.6–8.7)
RBC # BLD: 5.22 M/UL — HIGH (ref 3.8–5.2)
RBC # FLD: 13.6 % — SIGNIFICANT CHANGE UP (ref 10.3–14.5)
SODIUM SERPL-SCNC: 141 MMOL/L — SIGNIFICANT CHANGE UP (ref 135–145)
WBC # BLD: 11.97 K/UL — HIGH (ref 3.8–10.5)
WBC # FLD AUTO: 11.97 K/UL — HIGH (ref 3.8–10.5)

## 2022-06-28 PROCEDURE — 99232 SBSQ HOSP IP/OBS MODERATE 35: CPT

## 2022-06-28 RX ORDER — SODIUM CHLORIDE 9 MG/ML
500 INJECTION INTRAMUSCULAR; INTRAVENOUS; SUBCUTANEOUS ONCE
Refills: 0 | Status: DISCONTINUED | OUTPATIENT
Start: 2022-06-28 | End: 2022-06-30

## 2022-06-28 RX ADMIN — Medication 81 MILLIGRAM(S): at 21:18

## 2022-06-28 RX ADMIN — LEVALBUTEROL 1.25 MILLIGRAM(S): 1.25 SOLUTION, CONCENTRATE RESPIRATORY (INHALATION) at 00:42

## 2022-06-28 RX ADMIN — ENOXAPARIN SODIUM 40 MILLIGRAM(S): 100 INJECTION SUBCUTANEOUS at 05:16

## 2022-06-28 RX ADMIN — Medication 25 MILLIGRAM(S): at 05:17

## 2022-06-28 RX ADMIN — LEVALBUTEROL 1.25 MILLIGRAM(S): 1.25 SOLUTION, CONCENTRATE RESPIRATORY (INHALATION) at 09:11

## 2022-06-28 RX ADMIN — LEVALBUTEROL 1.25 MILLIGRAM(S): 1.25 SOLUTION, CONCENTRATE RESPIRATORY (INHALATION) at 16:11

## 2022-06-28 NOTE — PROGRESS NOTE ADULT - ASSESSMENT
Pt unable to provide history, therefore was obtained from daughter, 89 yo female with PMH of Dementia, COPD, dCHF, HTn was brought to the [facility] for SOB for 4-5 days got worse  In the ED, patietn was found hypoxic and afib with rvr. cough+, edema-, fever, her heart rate is better now, NSR, her ABG done showed hypercapnia respiratory failure due to COPd exacerbation and Acute congestive heart failure, in setting of Viral Pneumonia.     Impression:    #Acute Hypoxic hypercapnic respiratory failure due to COPD exacerbation and viral Pneumonia:   started on solumedrol 40 iv bid, feeling better will change it to 40mg daily    repeat ABG-compensated chronic resp acidosis  s/p NRM to venti mask,  Was on  nasal cannula now on Room Air   patient is a DNR/DNI  cultures sent will follow  procal 0.15  received vanc zosyn in ed, will hold off further abx,   repeat cultures came negative  will provide supportive  and close monitoring.   now require couple of liters of O2, will wean off slowly   Incentive spirometry     # Afib with rvr:   now paroxysmal going back and forth to NSR and AFIB  s/p iv metoprolol  lopressor 25mg BID   TTE looks ok   SHARITA VASC is 5  cardiology spoke with family as per them no anticoagulation in setting of high risk for fall   Trops negative  TSH normal      DCHF exacerbation:  likely due to afib rvr  looks euvolemic, will d/c lasix, will monitor closely, cardiology is following     dementia:  continue meds, daughter to bring her home meds     coag negative staph in 1 bottle, other bottle is negative, repeat cultures came negative, will follow off antibiotics, no leucocytosis, no fever spike.     DVT prophylaxis: Lovenox sc     Patient is DNR/DNI, will update MOLST form     Plan of care discussed with daughter at bedside      Pt unable to provide history, therefore was obtained from daughter, 89 yo female with PMH of Dementia, COPD, dCHF, HTn was brought to the [facility] for SOB for 4-5 days got worse  In the ED, patietn was found hypoxic and afib with rvr. cough+, edema-, fever, her heart rate is better now, NSR, her ABG done showed hypercapnia respiratory failure due to COPd exacerbation and Acute congestive heart failure, in setting of Viral Pneumonia.       #Acute Hypoxic hypercapnic respiratory failure due to COPD exacerbation and viral Pneumonia:   started on solumedrol 40 iv bid, feeling better will change it to 40mg daily    repeat ABG-compensated chronic resp acidosis  s/p NRM to venti mask,  Was on  nasal cannula now on Room Air   patient is a DNR/DNI  cultures sent will follow  procal 0.15  received vanc zosyn in ed, will hold off further abx,   repeat cultures came negative  will provide supportive  and close monitoring.   now require couple of liters of O2, will wean off slowly   Incentive spirometry     # Afib s/p RVR   now paroxysmal going back and forth to NSR and AFIB  s/p iv metoprolol  lopressor 25mg BID   TTE looks ok   SHARITA VASC is 5  cardiology spoke with family as per them no anticoagulation in setting of high risk for fall   Trops negative  TSH normal  HR better Controlled       #Acute on Chronic Diastolic  exacerbation:  likely due to afib rvr  looks euvolemic,   will d/c lasix, will monitor closely,   cardiology is following     #Dementia  continue meds, daughter to bring her home meds     coag negative staph in 1 bottle, other bottle is negative, repeat cultures came negative, will follow off antibiotics, no leucocytosis, no fever spike.     DVT prophylaxis: Lovenox sc     Patient is DNR/DNI, will update MOLST form     Plan of care discussed with daughter at bedside

## 2022-06-28 NOTE — DISCHARGE NOTE NURSING/CASE MANAGEMENT/SOCIAL WORK - NSDCFUADDAPPT_GEN_ALL_CORE_FT
Appointment made with Pulmonologist Dr. Monterroso 7/14/22  at 10:45 AM . If you are unable to attend your pre-scheduled appointment, please contact the office directly at [phone] to reschedule Appointment made with Pulmonologist Dr. Monterroso 7/14/22  at 10:45 AM . If you are unable to attend your pre-scheduled appointment, please contact the office directly at [phone] to reschedule  Declines meds to bed  STAR pt-will need follow up b4 DC  [facility] referral thru [facility]  No meds to bed from [facility], pt has confusion and don't require a med review  Yellow [facility]AR pack/folder given to tp        Appointment made with Pulmonologist Dr. Monterroso 7/14/22  at 10:45 AM . If you are unable to attend your pre-scheduled appointment, please contact the office directly at [phone] to reschedule  Appointment for cardio is pending [facility] pt-  [facility] referral thru [facility]  No meds to bed from [facility], pt has confusion and don't require a med review, content with pharmacy  Yellow STAR pack/folder given to pt    Appts: Cardio Dr Vega 7.11.2022 at 930am  Pulmonary: Dr Monterroso         Appointment made with Pulmonologist Dr. Monterroso 7/14/22  at 10:45 AM . If you are unable to attend your pre-scheduled appointment, please contact the office directly at [phone] to reschedule  Appointment for cardio is pending [facility] pt-  [facility] referral thru [facility]  No meds to bed from [facility], pt has confusion and don't require a med review, content with pharmacy  Yellow STAR pack/folder given to pt    Appts: Cardio Dr Vega 7.11.2022 at 930am  Pulmonary: Dr Monterroso 7.14.2022 at 1045am    [address] [facility] [id number] phone numbers given to tp        Appointment made with Pulmonologist Dr. Monterroso 7/14/22  at 10:45 AM . If you are unable to attend your pre-scheduled appointment, please contact the office directly at [phone] to reschedule  Appointment for cardio is pending [facility] pt-  [facility] referral thru [facility]  No meds to bed from [facility], pt has confusion and don't require a med review, content with pharmacy for most things  but will use [facility] for pal meds  Yellow STAR pack/folder given to pt    Appts: Cardio Dr Vega 7.11.2022 at 930am  Pulmonary: Dr Monterroso 7.14.2022 at 1045am    [address] [facility] [id number] phone numbers given to tp        Appointment made with Pulmonologist Dr. Monterroso 7/14/22  at 10:45 AM . If you are unable to attend your pre-scheduled appointment, please contact the office directly at [phone] to reschedule  Appointment for cardio is pending

## 2022-06-28 NOTE — DISCHARGE NOTE NURSING/CASE MANAGEMENT/SOCIAL WORK - PATIENT PORTAL LINK FT
You can access the FollowMyHealth Patient Portal offered by [facility] by registering at the following website: [email or web address]. By joining [employer]’s FollowMyHealth portal, you will also be able to view your health information using other applications (apps) compatible with our system.

## 2022-06-28 NOTE — DISCHARGE NOTE NURSING/CASE MANAGEMENT/SOCIAL WORK - NSDCPEFALRISK_GEN_ALL_CORE
For information on Fall & Injury Prevention, visit: [email or web address] OR  [email or web address] OR  https://www.cdc.gov/steadi/patient.html

## 2022-06-28 NOTE — PHYSICAL THERAPY INITIAL EVALUATION ADULT - ADDITIONAL COMMENTS
Pt poor historian, history as per pt's daughter: Pt lives with daughter (who is her caregiver) in a house with 4 [address] c rail, and resides on the main level. Pt amb with hand-held assist and has physical assistance for with functional mobility and ADLs. Pt's daughter completes all IADLs. Pt with history of dementia and requires assist.

## 2022-06-29 LAB
ALBUMIN SERPL ELPH-MCNC: 3.7 G/DL — SIGNIFICANT CHANGE UP (ref 3.3–5.2)
ALP SERPL-CCNC: 63 U/L — SIGNIFICANT CHANGE UP (ref 40–120)
ALT FLD-CCNC: 23 U/L — SIGNIFICANT CHANGE UP
ANION GAP SERPL CALC-SCNC: 10 MMOL/L — SIGNIFICANT CHANGE UP (ref 5–17)
AST SERPL-CCNC: 29 U/L — SIGNIFICANT CHANGE UP
BILIRUB SERPL-MCNC: 0.6 MG/DL — SIGNIFICANT CHANGE UP (ref 0.4–2)
BUN SERPL-MCNC: 40.4 MG/DL — HIGH (ref 8–20)
CALCIUM SERPL-MCNC: 9.2 MG/DL — SIGNIFICANT CHANGE UP (ref 8.6–10.2)
CHLORIDE SERPL-SCNC: 98 MMOL/L — SIGNIFICANT CHANGE UP (ref 98–107)
CO2 SERPL-SCNC: 33 MMOL/L — HIGH (ref 22–29)
CREAT SERPL-MCNC: 1.08 MG/DL — SIGNIFICANT CHANGE UP (ref 0.5–1.3)
EGFR: 47 ML/MIN/1.73M2 — LOW
GLUCOSE SERPL-MCNC: 93 MG/DL — SIGNIFICANT CHANGE UP (ref 70–99)
HCT VFR BLD CALC: 43.2 % — SIGNIFICANT CHANGE UP (ref 34.5–45)
HGB BLD-MCNC: 13.7 G/DL — SIGNIFICANT CHANGE UP (ref 11.5–15.5)
LACTATE BLDV-MCNC: 3.3 MMOL/L — HIGH (ref 0.5–2)
LACTATE SERPL-SCNC: 1.8 MMOL/L — SIGNIFICANT CHANGE UP (ref 0.5–2)
MCHC RBC-ENTMCNC: 27.9 PG — SIGNIFICANT CHANGE UP (ref 27–34)
MCHC RBC-ENTMCNC: 31.7 GM/DL — LOW (ref 32–36)
MCV RBC AUTO: 88 FL — SIGNIFICANT CHANGE UP (ref 80–100)
PLATELET # BLD AUTO: 177 K/UL — SIGNIFICANT CHANGE UP (ref 150–400)
POTASSIUM SERPL-MCNC: 4.6 MMOL/L — SIGNIFICANT CHANGE UP (ref 3.5–5.3)
POTASSIUM SERPL-SCNC: 4.6 MMOL/L — SIGNIFICANT CHANGE UP (ref 3.5–5.3)
PROT SERPL-MCNC: 6.3 G/DL — LOW (ref 6.6–8.7)
RBC # BLD: 4.91 M/UL — SIGNIFICANT CHANGE UP (ref 3.8–5.2)
RBC # FLD: 13.8 % — SIGNIFICANT CHANGE UP (ref 10.3–14.5)
SODIUM SERPL-SCNC: 141 MMOL/L — SIGNIFICANT CHANGE UP (ref 135–145)
WBC # BLD: 9.68 K/UL — SIGNIFICANT CHANGE UP (ref 3.8–10.5)
WBC # FLD AUTO: 9.68 K/UL — SIGNIFICANT CHANGE UP (ref 3.8–10.5)

## 2022-06-29 PROCEDURE — 99232 SBSQ HOSP IP/OBS MODERATE 35: CPT

## 2022-06-29 RX ORDER — SODIUM CHLORIDE 9 MG/ML
500 INJECTION INTRAMUSCULAR; INTRAVENOUS; SUBCUTANEOUS ONCE
Refills: 0 | Status: COMPLETED | OUTPATIENT
Start: 2022-06-29 | End: 2022-06-29

## 2022-06-29 RX ADMIN — Medication 81 MILLIGRAM(S): at 11:28

## 2022-06-29 RX ADMIN — LEVALBUTEROL 1.25 MILLIGRAM(S): 1.25 SOLUTION, CONCENTRATE RESPIRATORY (INHALATION) at 00:51

## 2022-06-29 RX ADMIN — ENOXAPARIN SODIUM 40 MILLIGRAM(S): 100 INJECTION SUBCUTANEOUS at 05:10

## 2022-06-29 RX ADMIN — SODIUM CHLORIDE 500 MILLILITER(S): 9 INJECTION INTRAMUSCULAR; INTRAVENOUS; SUBCUTANEOUS at 02:10

## 2022-06-29 RX ADMIN — Medication 25 MILLIGRAM(S): at 05:06

## 2022-06-29 RX ADMIN — PANTOPRAZOLE SODIUM 40 MILLIGRAM(S): 20 TABLET, DELAYED RELEASE ORAL at 05:07

## 2022-06-29 RX ADMIN — Medication 25 MILLIGRAM(S): at 18:56

## 2022-06-29 RX ADMIN — LEVALBUTEROL 1.25 MILLIGRAM(S): 1.25 SOLUTION, CONCENTRATE RESPIRATORY (INHALATION) at 13:51

## 2022-06-29 NOTE — PROGRESS NOTE ADULT - ASSESSMENT
89 yo female with Alzheimers dementia, COPD, diastolic HF, HTN admitted with acute hypoxic resp failure 2/2 acute decompensated HF with Afib with RVR, likely driven by COPD exacerbation provoked by acute viral PNA     Acute Hypoxic hypercapnic respiratory failure due to COPD exacerbation and viral Pneumonia:   Now on RA, transitioned off IV Solumedrol to Prednisone 40 mg daily with gradual taper   repeat ABG-compensated chronic resp acidosis  s/p NRM to venti mask > NC > RA  patient is a DNR/DNI  /sp Vanc/Zosyn initially ER. Blood cultures initially with CoNS.  Repeat cx neg  procal 0.15  will provide supportive  and close monitoring.   Incentive spirometry   Will assess O2 requirements on ambulation     Afib s/p RVR   now paroxysmal going back and forth to NSR and AFIB  s/p iv metoprolol and Digoxin IV   lopressor 25mg BID   TTE reviewed. D/w Cardiology, will titrate Metoprolol for optimal rate control  No AC due to high fall risk   TSH normal  HR better Controlled     Acute on Chronic Diastolic  exacerbation:  Driven by Afib with RVR   Euvolemic on exam. Will defer using further Lasix at this time. D/w Cardiology     Dementia  continue meds, daughter to bring her home meds       DVT prophylaxis: Lovenox sc     Patient is DNR/DNI, will update MOLST form     Plan of care discussed with daughter at bedside

## 2022-06-29 NOTE — PROGRESS NOTE ADULT - NUTRITIONAL ASSESSMENT
This patient has been assessed with a concern for Malnutrition and has been determined to have a diagnosis/diagnoses of Severe protein-calorie malnutrition and Underweight (BMI < 19).    This patient is being managed with:   Diet DASH/TLC-  Sodium & Cholesterol Restricted  Entered: Jun 24 2022 11:04PM    

## 2022-06-29 NOTE — PROGRESS NOTE ADULT - REASON FOR ADMISSION
CHF exacerbation  viral PNA

## 2022-06-29 NOTE — CHART NOTE - NSCHARTNOTEFT_GEN_A_CORE
Source: Patient [ ]  Family [ ]   other [x ]    Current Diet: Diet, DASH/TLC:   Sodium & Cholesterol Restricted (06-24-22 @ 23:04)    PO intake:  < 50% [ ]   50-75%  [x ]   %  [ ]  other :    Current Weight:   (6/28) 99.8 lbs  Obtain daily wts, continue to monitor  No edema noted    Pertinent Medications: MEDICATIONS  (STANDING):  aspirin  chewable 81 milliGRAM(s) Oral daily  enoxaparin Injectable 40 milliGRAM(s) SubCutaneous every 24 hours  levalbuterol Inhalation 1.25 milliGRAM(s) Inhalation every 8 hours  metoprolol tartrate 25 milliGRAM(s) Oral two times a day  Namzaric ER 28mg-10mg  CAPSULE 1 Capsule(s) 1 Capsule(s) Oral at bedtime  pantoprazole    Tablet 40 milliGRAM(s) Oral before breakfast  sodium chloride 0.9% Bolus 500 milliLiter(s) IV Bolus once    MEDICATIONS  (PRN):  acetaminophen     Tablet .. 650 milliGRAM(s) Oral every 6 hours PRN Temp greater or equal to 38C (100.4F), Mild Pain (1 - 3)  LORazepam     Tablet 0.25 milliGRAM(s) Oral two times a day PRN Anxiety and agitation  metoprolol tartrate Injectable 5 milliGRAM(s) IV Push every 6 hours PRN if HR >120  ondansetron Injectable 4 milliGRAM(s) IV Push every 8 hours PRN Nausea and/or Vomiting    Pertinent Labs: CBC Full  -  ( 29 Jun 2022 06:03 )  WBC Count : 9.68 K/uL  RBC Count : 4.91 M/uL  Hemoglobin : 13.7 g/dL  Hematocrit : 43.2 %  Platelet Count - Automated : 177 K/uL  Mean Cell Volume : 88.0 fl  Mean Cell Hemoglobin : 27.9 pg  Mean Cell Hemoglobin Concentration : 31.7 gm/dL  Auto Neutrophil # : x  Auto Lymphocyte # : x  Auto Monocyte # : x  Auto Eosinophil # : x  Auto Basophil # : x  Auto Neutrophil % : x  Auto Lymphocyte % : x  Auto Monocyte % : x  Auto Eosinophil % : x  Auto Basophil % : x  06-29 Na141 mmol/L Glu 93 mg/dL K+ 4.6 mmol/L Cr  1.08 mg/dL BUN 40.4 mg/dL<H> Phos n/a   Alb 3.7 g/dL PAB n/a       Skin: buttocks stage I    Nutrition focused physical exam previously conducted - found signs of malnutrition [ ]absent [x ]present    Subcutaneous fat loss: [ x] Orbital fat pads region, [x ]Buccal fat region, [ ]Triceps region,  [ ]Ribs region    Muscle wasting: [x ]Temples region, [ x]Clavicle region, [x ]Shoulder region, [ ]Scapula region, [x ]Interosseous region,  [ x]thigh region, [ x]Calf region    Estimated Needs:   [x ] no change since previous assessment  [ ] recalculated:     Current Nutrition Diagnosis: Pt remains at high nutrition risk and meets criteria for severe chronic malnutrition related to inability to meet sufficient protein-energy needs in setting of URI, acute resp fail with hypoxia, COPD, advanced age as evidenced by sev muscle/fat loss, 7lb (6%) wt loss x 6mo. 1:1 feeding pt during assessment, stated pt doing well with breakfast. RD to remain available.     Recommendations:   1) Add Ensure Enlive TID to optimize po intake and provide an additional 350kcal, 20g protein per serving  2) Continue assistance with meals  3) RX: MVI daily  4) Monitor wts and labs    Monitoring and Evaluation:   [ x] PO intake [x ] Tolerance to diet prescription [X] Weights  [X] Follow up per protocol [X] Labs:

## 2022-06-29 NOTE — PROGRESS NOTE ADULT - SUBJECTIVE AND OBJECTIVE BOX
[facility]                                                         CARDIOLOGY AT [address], [place] [address]                                                         Telephone: [phone]. Fax:[phone]                                                                             PROGRESS NOTE    Reason for follow up: SOB  Update: euvolemic on exam, confused at baseline      Vitals:  T(C): 36.6 (06-26-22 @ 08:12), Max: 36.6 (06-26-22 @ 08:12)  HR: 66 (06-26-22 @ 08:12) (66 - 128)  BP: 104/63 (06-26-22 @ 08:12) (99/63 - 133/80)  RR: 19 (06-26-22 @ 08:12) (18 - 20)  SpO2: 93% (06-26-22 @ 04:35) (93% - 98%)  Wt(kg): --  I&O's Summary    Weight (kg): 40.8 (06-24 @ 18:47)    PHYSICAL EXAM:  Appearance: Comfortable. No acute distress  HEENT:  Atraumatic. Normocephalic.  Normal oral mucosa  Neurologic: A & O x 3, no gross focal deficits.  Cardiovascular: RRR S1 S2, No murmur, no rubs/gallops. No JVD  Respiratory: Lungs clear to auscultation, unlabored   Gastrointestinal:  Soft, Non-tender, + BS  Lower Extremities: 2+ Peripheral Pulses, No clubbing, cyanosis, or edema  Psychiatry: Patient is calm. No agitation.   Skin: warm and dry.    CURRENT CARDIAC MEDICATIONS:  furosemide   Injectable 20 milliGRAM(s) IV Push daily  metoprolol tartrate 25 milliGRAM(s) Oral two times a day  metoprolol tartrate Injectable 5 milliGRAM(s) IV Push every 6 hours PRN      CURRENT OTHER MEDICATIONS:  levalbuterol Inhalation 1.25 milliGRAM(s) Inhalation every 8 hours  vancomycin  IVPB 750 milliGRAM(s) IV Intermittent once  acetaminophen     Tablet .. 650 milliGRAM(s) Oral every 6 hours PRN Temp greater or equal to 38C (100.4F), Mild Pain (1 - 3)  ondansetron Injectable 4 milliGRAM(s) IV Push every 8 hours PRN Nausea and/or Vomiting  pantoprazole    Tablet 40 milliGRAM(s) Oral before breakfast  methylPREDNISolone sodium succinate Injectable 40 milliGRAM(s) IV Push daily  aspirin  chewable 81 milliGRAM(s) Oral daily  enoxaparin Injectable 40 milliGRAM(s) SubCutaneous every 24 hours      LABS:	 	  ( 26 Jun 2022 07:45 )  Troponin T  0.03 ,  CPK  X    , CKMB  X    , BNP X        , ( 25 Jun 2022 07:18 )  Troponin T  0.02 ,  CPK  X    , CKMB  X    , BNP X        , ( 24 Jun 2022 19:39 )  Troponin T  0.03 ,  CPK  X    , CKMB  X    , BNP 2247<H>                              13.4   7.78  )-----------( 158      ( 26 Jun 2022 07:45 )             45.4     06-26    143  |  96<L>  |  29.7<H>  ----------------------------<  129<H>  4.2   |  39.0<H>  |  1.24    Ca    9.1      26 Jun 2022 07:45  Phos  4.8     06-26  Mg     1.9     06-26    TPro  6.9  /  Alb  4.1  /  TBili  0.4  /  DBili  x   /  AST  21  /  ALT  17  /  AlkPhos  73  06-26    PT/INR/PTT ( 26 Jun 2022 07:45 )                       :                       :      11.5         :       31.2                  .        .                   .              .           .       0.99        .                                       Lipid Profile: Date: 06-25 @ 07:18  Total cholesterol 196; Direct LDL: --; HDL: 75; Triglycerides:62    HgA1c:   TSH: Thyroid Stimulating Hormone, Serum: 0.66 uIU/mL      TELEMETRY: Afib 105  ECG:    DIAGNOSTIC TESTING:  [ ] Echocardiogram: < from: TTE Echo Complete w/ Contrast w/ Doppler (06.25.22 @ 10:03) >  PHYSICIAN INTERPRETATION:  Left Ventricle: The left ventricular internal cavity size is normal.  Global LV systolic function was normal. Left ventricular ejection   fraction, by visual estimation, is 60 to 65%. Spectral Doppler shows   impaired relaxation pattern of left ventricular myocardial filling (Grade   I diastolic dysfunction).  Right Ventricle: The right ventricular size is normal. RV systolic   function is normal.  Left Atrium: The left atrium is normal in size.  Right Atrium: The right atrium is normal in size.  Pericardium: There is no evidence of pericardial effusion.  Mitral Valve: Thickening and calcification of the anterior and posterior   mitral valve leaflets. There is moderate mitral annular calcification.   Mild to moderate mitral valve regurgitation is seen.  Tricuspid Valve: The tricuspid valve is normal in structure. Mild   tricuspid regurgitation is visualized.  Aortic Valve: The aortic valve is trileaflet. Mild to moderate aortic   stenosis is present. Mild to moderate aortic valve regurgitation is seen.  Pulmonic Valve: The pulmonic valve is normal. No indication of pulmonic   valve regurgitation.  Aorta: The aortic root is normal in size and structure.  Pulmonary Artery: The pulmonary artery is not well seen.  Venous: The inferior vena cava is not well visualized.      Summary:   1. Left ventricular ejection fraction, by visual estimation, is 60 to   65%.   2. Normal global left ventricular systolic function.   3. Spectral Doppler shows impaired relaxation pattern of left   ventricular myocardial filling (Grade I diastolic dysfunction).   4. There is mild concentric left ventricular hypertrophy.   5. There is no evidence of pericardial effusion.   6. Mild to moderate mitral valve regurgitation.   7. Moderate mitral annular calcification.   8. Thickening and calcification of the anterior and posterior mitral   valve leaflets.   9. Mild tricuspid regurgitation.  10. Mild to moderate aortic regurgitation.  11. Mild to moderate aortic valve stenosis.    MD Mikey Electronically signed on 6/25/2022 at 5:26:41 PM      < end of copied text >    [ ]  Catheterization:  [ ] Stress Test:    OTHER: 	      
    INTERVAL HPI/OVERNIGHT EVENTS:  Patient is looking better,   her breathing is better,   Patient confused   has no chest pain, fever, chills.      PHYSICAL EXAM:  Vital Signs Last 24 Hrs  T(C): 36.7 (28 Jun 2022 11:18), Max: 36.7 (28 Jun 2022 11:18)  T(F): 98.1 (28 Jun 2022 11:18), Max: 98.1 (28 Jun 2022 11:18)  HR: 92 (28 Jun 2022 11:18) (57 - 92)  BP: 94/57 (28 Jun 2022 11:18) (86/52 - 141/79)  BP(mean): --  RR: 16 (28 Jun 2022 11:18) (16 - 16)  SpO2: 96% (28 Jun 2022 11:18) (96% - 98%)  GENERAL: Elderly female looking comfortable   HEENT: PERRL, +EOMI  NECK: soft, Supple, No JVD  CHEST/LUNG: decrease air entry  bilaterally; No wheezing  HEART: S1S2+, Regular rate and rhythm; No murmurs  ABDOMEN: Soft, Nontender, Nondistended; Bowel sounds present  EXTREMITIES:  1+ Peripheral Pulses, No edema  SKIN: No rashes or lesions  NEURO:OX2, no focal deficits  PSYCH: normal mood                          13.2   8.36  )-----------( 131      ( 27 Jun 2022 09:09 )             43.6     06-27    137  |  95<L>  |  41.5<H>  ----------------------------<  127<H>  4.8   |  34.0<H>  |  1.15    Ca    8.9      27 Jun 2022 09:09    TPro  6.6  /  Alb  3.7  /  TBili  0.4  /  DBili  x   /  AST  23  /  ALT  16  /  AlkPhos  69  06-27              Culture - Blood (collected 26 Jun 2022 11:01)  Source: .Blood Blood  Preliminary Report (27 Jun 2022 12:02):    No growth to date.    Culture - Blood (collected 26 Jun 2022 11:01)  Source: .Blood Blood-Peripheral  Preliminary Report (27 Jun 2022 12:02):    No growth to date.      CAPILLARY BLOOD GLUCOSE          MEDICATIONS  (STANDING):  aspirin  chewable 81 milliGRAM(s) Oral daily  enoxaparin Injectable 40 milliGRAM(s) SubCutaneous every 24 hours  levalbuterol Inhalation 1.25 milliGRAM(s) Inhalation every 8 hours  metoprolol tartrate 25 milliGRAM(s) Oral two times a day  Namzaric ER 28mg-10mg  CAPSULE 1 Capsule(s) 1 Capsule(s) Oral at bedtime  pantoprazole    Tablet 40 milliGRAM(s) Oral before breakfast    MEDICATIONS  (PRN):  acetaminophen     Tablet .. 650 milliGRAM(s) Oral every 6 hours PRN Temp greater or equal to 38C (100.4F), Mild Pain (1 - 3)  LORazepam     Tablet 0.25 milliGRAM(s) Oral two times a day PRN Anxiety and agitation  metoprolol tartrate Injectable 5 milliGRAM(s) IV Push every 6 hours PRN if HR >120  ondansetron Injectable 4 milliGRAM(s) IV Push every 8 hours PRN Nausea and/or Vomiting      I&O's Summary    EDWIN MORIN    [id number]    89y      Female    Patient is a 89y old  Female who presents with a chief complaint of Heart failure     (26 Jun 2022 14:33)    
EDWIN MORIN    [id number]    89y      Female    Patient is a 89y old  Female who presents with a chief complaint of CHF exacerbation  viral PNA (2022 01:15)      INTERVAL HPI/OVERNIGHT EVENTS:      Patient is lethargic, unable to get much info from the patient.      REVIEW OF SYSTEMS:    limited       Vital Signs Last 24 Hrs  T(C): 36.4 (2022 12:36), Max: 36.8 (2022 22:25)  T(F): 97.5 (2022 12:36), Max: 98.2 (2022 22:25)  HR: 69 (2022 12:36) (64 - 204)  BP: 117/68 (2022 12:36) (77/52 - 175/90)  RR: 18 (2022 12:36) (18 - 26)  SpO2: 98% (2022 12:36) (87% - 100%)    PHYSICAL EXAM:    GENERAL: Elderly female looking lethargic  HEENT: atraumatic   NECK: soft, Supple, No JVD  CHEST/LUNG: Decrease air entry bilaterally; mild wheezing  HEART: S1S2+, Regular rate and rhythm; No murmurs  ABDOMEN: Soft, Nontender, Nondistended; Bowel sounds present  EXTREMITIES:  1+ Peripheral Pulses, No edema  SKIN: No rashes or lesions  NEURO: Not participating in exam much     LABS:                        14.4   2.68  )-----------( 137      ( 2022 07:18 )             46.8     06-25    141  |  95<L>  |  18.3  ----------------------------<  178<H>  4.5   |  32.0<H>  |  1.26    Ca    8.9      2022 07:18    TPro  7.0  /  Alb  3.8  /  TBili  0.4  /  DBili  x   /  AST  29  /  ALT  18  /  AlkPhos  80  06-25    PT/INR - ( 2022 07:18 )   PT: 11.7 sec;   INR: 1.01 ratio         PTT - ( 2022 19:36 )  PTT:27.1 sec  Urinalysis Basic - ( 2022 21:39 )    Color: Yellow / Appearance: Clear / S.010 / pH: x  Gluc: x / Ketone: Negative  / Bili: Negative / Urobili: Negative mg/dL   Blood: x / Protein: Negative / Nitrite: Negative   Leuk Esterase: Negative / RBC: 0-2 /HPF / WBC Negative /HPF   Sq Epi: x / Non Sq Epi: Occasional / Bacteria: x          I&O's Summary      MEDICATIONS  (STANDING):  albuterol/ipratropium for Nebulization 3 milliLiter(s) Nebulizer every 6 hours  aspirin  chewable 81 milliGRAM(s) Oral daily  donepezil 10 milliGRAM(s) Oral at bedtime  enoxaparin Injectable 40 milliGRAM(s) SubCutaneous every 24 hours  furosemide   Injectable 20 milliGRAM(s) IV Push daily  memantine 10 milliGRAM(s) Oral at bedtime  methylPREDNISolone sodium succinate Injectable 40 milliGRAM(s) IV Push daily  metoprolol tartrate 25 milliGRAM(s) Oral two times a day    MEDICATIONS  (PRN):  acetaminophen     Tablet .. 650 milliGRAM(s) Oral every 6 hours PRN Temp greater or equal to 38C (100.4F), Mild Pain (1 - 3)  ondansetron Injectable 4 milliGRAM(s) IV Push every 8 hours PRN Nausea and/or Vomiting        
EDWIN MORIN    [id number]    89y      Female    Patient is a 89y old  Female who presents with a chief complaint of CHF exacerbation  viral PNA (2022 10:21)      INTERVAL HPI/OVERNIGHT EVENTS:      Patient is feeling some improvement down to Nasal cannulla 3 liters, has no chest pain, sob is improving, has no fever, chills, still has some intermittent afib with RVR.     REVIEW OF SYSTEMS:    CONSTITUTIONAL: No fever, some fatigue  RESPIRATORY: No cough, improving shortness of breath  CARDIOVASCULAR: No chest pain, palpitations      Vital Signs Last 24 Hrs  T(C): 36.6 (2022 08:12), Max: 36.6 (2022 08:12)  T(F): 97.9 (2022 08:12), Max: 97.9 (2022 08:12)  HR: 66 (2022 08:12) (66 - 128)  BP: 104/63 (2022 08:12) (99/63 - 133/80)  RR: 19 (2022 08:12) (19 - 20)  SpO2: 93% (2022 04:35) (93% - 97%)    PHYSICAL EXAM:    GENERAL: Elderly female looking comfortable   HEENT: PERRL, +EOMI  NECK: soft, Supple, No JVD,   CHEST/LUNG: decrease air entry  bilaterally; No wheezing  HEART: S1S2+, Regular rate and rhythm; No murmurs  ABDOMEN: Soft, Nontender, Nondistended; Bowel sounds present  EXTREMITIES:  1+ Peripheral Pulses, No edema  SKIN: No rashes or lesions  NEURO:OX2, no focal deficits  PSYCH: normal mood      LABS:                        13.4   7.78  )-----------( 158      ( 2022 07:45 )             45.4     06-26    143  |  96<L>  |  29.7<H>  ----------------------------<  129<H>  4.2   |  39.0<H>  |  1.24    Ca    9.1      2022 07:45  Phos  4.8     06-  Mg     1.9     06-    TPro  6.9  /  Alb  4.1  /  TBili  0.4  /  DBili  x   /  AST  21  /  ALT  17  /  AlkPhos  73  06-26    PT/INR - ( 2022 07:45 )   PT: 11.5 sec;   INR: 0.99 ratio         PTT - ( 2022 07:45 )  PTT:31.2 sec  Urinalysis Basic - ( 2022 21:39 )    Color: Yellow / Appearance: Clear / S.010 / pH: x  Gluc: x / Ketone: Negative  / Bili: Negative / Urobili: Negative mg/dL   Blood: x / Protein: Negative / Nitrite: Negative   Leuk Esterase: Negative / RBC: 0-2 /HPF / WBC Negative /HPF   Sq Epi: x / Non Sq Epi: Occasional / Bacteria: x          I&O's Summary      MEDICATIONS  (STANDING):  aspirin  chewable 81 milliGRAM(s) Oral daily  enoxaparin Injectable 40 milliGRAM(s) SubCutaneous every 24 hours  furosemide   Injectable 20 milliGRAM(s) IV Push daily  levalbuterol Inhalation 1.25 milliGRAM(s) Inhalation every 8 hours  methylPREDNISolone sodium succinate Injectable 40 milliGRAM(s) IV Push daily  metoprolol tartrate 25 milliGRAM(s) Oral two times a day  Namzaric ER 28mg-10mg  CAPSULE 1 Capsule(s) 1 Capsule(s) Oral at bedtime  pantoprazole    Tablet 40 milliGRAM(s) Oral before breakfast    MEDICATIONS  (PRN):  acetaminophen     Tablet .. 650 milliGRAM(s) Oral every 6 hours PRN Temp greater or equal to 38C (100.4F), Mild Pain (1 - 3)  metoprolol tartrate Injectable 5 milliGRAM(s) IV Push every 6 hours PRN if HR >120  ondansetron Injectable 4 milliGRAM(s) IV Push every 8 hours PRN Nausea and/or Vomiting        
[facility] Division of Hospital Medicine    Chief Complaint:  Afib RVR    SUBJECTIVE / OVERNIGHT EVENTS:  Pt examined lying in bed  Titrated to RA (satting 90%) at rest   Patient denies chest pain, , abd pain, N/V, fever, chills, dysuria or any other complaints. All remainder ROS negative.     MEDICATIONS  (STANDING):  aspirin  chewable 81 milliGRAM(s) Oral daily  enoxaparin Injectable 40 milliGRAM(s) SubCutaneous every 24 hours  levalbuterol Inhalation 1.25 milliGRAM(s) Inhalation every 8 hours  metoprolol tartrate 25 milliGRAM(s) Oral two times a day  Namzaric ER 28mg-10mg  CAPSULE 1 Capsule(s) 1 Capsule(s) Oral at bedtime  pantoprazole    Tablet 40 milliGRAM(s) Oral before breakfast  sodium chloride 0.9% Bolus 500 milliLiter(s) IV Bolus once    MEDICATIONS  (PRN):  acetaminophen     Tablet .. 650 milliGRAM(s) Oral every 6 hours PRN Temp greater or equal to 38C (100.4F), Mild Pain (1 - 3)  LORazepam     Tablet 0.25 milliGRAM(s) Oral two times a day PRN Anxiety and agitation  metoprolol tartrate Injectable 5 milliGRAM(s) IV Push every 6 hours PRN if HR >120  ondansetron Injectable 4 milliGRAM(s) IV Push every 8 hours PRN Nausea and/or Vomiting        I&O's Summary      PHYSICAL EXAM:  Vital Signs Last 24 Hrs  T(C): 36.7 (29 Jun 2022 10:30), Max: 36.9 (29 Jun 2022 04:46)  T(F): 98.1 (29 Jun 2022 10:30), Max: 98.4 (29 Jun 2022 04:46)  HR: 105 (29 Jun 2022 10:30) (88 - 105)  BP: 124/76 (29 Jun 2022 10:30) (124/76 - 139/87)  BP(mean): --  RR: 17 (29 Jun 2022 10:30) (17 - 18)  SpO2: 90% (29 Jun 2022 10:30) (90% - 92%)        CONSTITUTIONAL: Non toxic appearing elderly female lying in bed\  ENMT: Moist oral mucosa, no pharyngeal injection or exudates; normal dentition  RESPIRATORY: Normal respiratory effort; lungs are clear to auscultation bilaterally  CARDIOVASCULAR: Irregularly irregular, rate and rhythm, normal S1 and S2, no murmur/rub/gallop; No lower extremity edema; Peripheral pulses are 2+ bilaterally  ABDOMEN: Nontender to palpation, normoactive bowel sounds, no rebound/guarding; No hepatosplenomegaly  MUSCLOSKELETAL: no clubbing or cyanosis of digits; no joint swelling or tenderness to palpation  PSYCH: A+O 1-2  NEUROLOGY: CN 2-12 are intact and symmetric; no gross sensory deficits;   SKIN: No rashes; no palpable lesions    LABS:                        13.7   9.68  )-----------( 177      ( 29 Jun 2022 06:03 )             43.2     06-29    141  |  98  |  40.4<H>  ----------------------------<  93  4.6   |  33.0<H>  |  1.08    Ca    9.2      29 Jun 2022 06:03    TPro  6.3<L>  /  Alb  3.7  /  TBili  0.6  /  DBili  x   /  AST  29  /  ALT  23  /  AlkPhos  63  06-29              CAPILLARY BLOOD GLUCOSE            RADIOLOGY & ADDITIONAL TESTS:    6/25/22 TTE  Summary:   1. Left ventricular ejection fraction, by visual estimation, is 60 to 65%.   2. Normal global left ventricular systolic function.   3. Spectral Doppler shows impaired relaxation pattern of left ventricular myocardial filling (Grade I diastolic dysfunction).   4. There is mild concentric left ventricular hypertrophy.   5. There is no evidence of pericardial effusion.   6. Mild to moderate mitral valve regurgitation.   7. Moderate mitral annular calcification.   8. Thickening and calcification of the anterior and posterior mitral valve leaflets.   9. Mild tricuspid regurgitation.  10. Mild to moderate aortic regurgitation.  11. Mild to moderate aortic valve stenosis.                                        
EDWIN MORIN    [id number]    89y      Female    Patient is a 89y old  Female who presents with a chief complaint of Heart failure     (26 Jun 2022 14:33)      INTERVAL HPI/OVERNIGHT EVENTS:    patient is looking better, her breathing is better, has no chest pain, fever, chills.      REVIEW OF SYSTEMS:    Limited       Vital Signs Last 24 Hrs  T(C): 36.3 (27 Jun 2022 11:41), Max: 37 (27 Jun 2022 07:59)  T(F): 97.3 (27 Jun 2022 11:41), Max: 98.6 (27 Jun 2022 07:59)  HR: 53 (27 Jun 2022 11:41) (53 - 153)  BP: 109/83 (27 Jun 2022 11:41) (90/51 - 115/52)  BP(mean): --  RR: 18 (27 Jun 2022 11:41) (18 - 19)  SpO2: 100% (27 Jun 2022 11:41) (90% - 100%)    PHYSICAL EXAM:    GENERAL: Elderly female looking comfortable   HEENT: PERRL, +EOMI  NECK: soft, Supple, No JVD  CHEST/LUNG: decrease air entry  bilaterally; No wheezing  HEART: S1S2+, Regular rate and rhythm; No murmurs  ABDOMEN: Soft, Nontender, Nondistended; Bowel sounds present  EXTREMITIES:  1+ Peripheral Pulses, No edema  SKIN: No rashes or lesions  NEURO:OX2, no focal deficits  PSYCH: normal mood    LABS:                        13.2   8.36  )-----------( 131      ( 27 Jun 2022 09:09 )             43.6     06-27    137  |  95<L>  |  41.5<H>  ----------------------------<  127<H>  4.8   |  34.0<H>  |  1.15    Ca    8.9      27 Jun 2022 09:09  Phos  4.8     06-26  Mg     1.9     06-26    TPro  6.6  /  Alb  3.7  /  TBili  0.4  /  DBili  x   /  AST  23  /  ALT  16  /  AlkPhos  69  06-27    PT/INR - ( 26 Jun 2022 07:45 )   PT: 11.5 sec;   INR: 0.99 ratio         PTT - ( 26 Jun 2022 07:45 )  PTT:31.2 sec        I&O's Summary      MEDICATIONS  (STANDING):  aspirin  chewable 81 milliGRAM(s) Oral daily  enoxaparin Injectable 40 milliGRAM(s) SubCutaneous every 24 hours  levalbuterol Inhalation 1.25 milliGRAM(s) Inhalation every 8 hours  LORazepam     Tablet 0.25 milliGRAM(s) Oral once  metoprolol tartrate 25 milliGRAM(s) Oral two times a day  Namzaric ER 28mg-10mg  CAPSULE 1 Capsule(s) 1 Capsule(s) Oral at bedtime  pantoprazole    Tablet 40 milliGRAM(s) Oral before breakfast    MEDICATIONS  (PRN):  acetaminophen     Tablet .. 650 milliGRAM(s) Oral every 6 hours PRN Temp greater or equal to 38C (100.4F), Mild Pain (1 - 3)  LORazepam     Tablet 0.25 milliGRAM(s) Oral two times a day PRN Anxiety and agitation  metoprolol tartrate Injectable 5 milliGRAM(s) IV Push every 6 hours PRN if HR >120  ondansetron Injectable 4 milliGRAM(s) IV Push every 8 hours PRN Nausea and/or Vomiting        
                                                               [place]                                                        Office: [address] [place] [address]                                                       Telephone: [phone]. Fax:[phone]                                                                             PROGRESS NOTE    Subjective:  Patient is resting comfortably. Not in distress. Patients daughter at bedside     Review of symptoms:   Cardiac:  No chest pain. No dyspnea. No palpitations.  Respiratory:no cough. No dyspnea  Gastrointestinal: No diarrhea. No abdominal pain. No bleeding.   Neuro: No focal neuro complaints.      	  Vitals:  T(C): 36.3 (06-27-22 @ 11:41), Max: 37 (06-27-22 @ 07:59)  HR: 53 (06-27-22 @ 11:41) (53 - 153)  BP: 109/83 (06-27-22 @ 11:41) (90/51 - 115/52)  RR: 18 (06-27-22 @ 11:41) (18 - 19)  SpO2: 100% (06-27-22 @ 11:41) (90% - 100%)  Wt(kg): --  I&O's Summary    Weight (kg): 40.8 (06-24 @ 18:47)    PHYSICAL EXAM:  Appearance: Comfortable. No acute distress  HEENT:  Head and neck: Atraumatic. Normocephalic. , Neck is supple. No JVD,   Neurologic: Alert and awake, Grossly nonfocal.   Lymphatic: No cervical lymphadenopathy  Cardiovascular: Normal S1 S2, No murmurs. No JVD,   Respiratory: Lungs clear to auscultation  Gastrointestinal:  Soft, Non-tender, + BS  Lower Extremities: No edema  Psychiatry: Patient is calm. No agitation.  Skin: No rashes.    CURRENT MEDICATIONS:    MEDICATIONS  (STANDING):  aspirin  chewable 81 milliGRAM(s) Oral daily  enoxaparin Injectable 40 milliGRAM(s) SubCutaneous every 24 hours  levalbuterol Inhalation 1.25 milliGRAM(s) Inhalation every 8 hours  LORazepam     Tablet 0.25 milliGRAM(s) Oral once  metoprolol tartrate 25 milliGRAM(s) Oral two times a day  Namzaric ER 28mg-10mg  CAPSULE 1 Capsule(s) 1 Capsule(s) Oral at bedtime  pantoprazole    Tablet 40 milliGRAM(s) Oral before breakfast      LABS:	 	                          13.2   8.36  )-----------( 131      ( 27 Jun 2022 09:09 )             43.6     06-27    137  |  95<L>  |  41.5<H>  ----------------------------<  127<H>  4.8   |  34.0<H>  |  1.15    Ca    8.9      27 Jun 2022 09:09  Phos  4.8     06-26  Mg     1.9     06-26    TPro  6.6  /  Alb  3.7  /  TBili  0.4  /  DBili  x   /  AST  23  /  ALT  16  /  AlkPhos  69  06-27    proBNP: Serum Pro-Brain Natriuretic Peptide: 2247 pg/mL (06-24 @ 19:39)    Lipid Profile: Date: 06-25 @ 07:18  Total cholesterol 196; Direct LDL: --; HDL: 75; Triglycerides:62    CARDIAC MARKERS ( 26 Jun 2022 07:45 )  x     / 0.03 ng/mL / x     / x     / x          LIVER FUNCTIONS - ( 27 Jun 2022 09:09 )  Alb: 3.7 g/dL / Pro: 6.6 g/dL / ALK PHOS: 69 U/L / ALT: 16 U/L / AST: 23 U/L / GGT: x             TELEMETRY: Reviewed  - Patient had Afib with RVR last evening for 6-7 hours. Now in NSR.    ECG:

## 2022-06-29 NOTE — CHART NOTE - NSCHARTNOTEFT_GEN_A_CORE
Patient is maintaining NSR.  No active cardiac issues  Will sign off.  If there is any cardiac issue, Please reconsult    Discussed with the hospitalist

## 2022-06-30 ENCOUNTER — TRANSCRIPTION ENCOUNTER (OUTPATIENT)
Age: 87
End: 2022-06-30

## 2022-06-30 VITALS — OXYGEN SATURATION: 90 %

## 2022-06-30 PROBLEM — Z00.00 ENCOUNTER FOR PREVENTIVE HEALTH EXAMINATION: Status: ACTIVE | Noted: 2022-06-30

## 2022-06-30 LAB
-  AMPICILLIN/SULBACTAM: SIGNIFICANT CHANGE UP
-  CEFAZOLIN: SIGNIFICANT CHANGE UP
-  CLINDAMYCIN: SIGNIFICANT CHANGE UP
-  ERYTHROMYCIN: SIGNIFICANT CHANGE UP
-  GENTAMICIN: SIGNIFICANT CHANGE UP
-  OXACILLIN: SIGNIFICANT CHANGE UP
-  PENICILLIN: SIGNIFICANT CHANGE UP
-  RIFAMPIN: SIGNIFICANT CHANGE UP
-  TETRACYCLINE: SIGNIFICANT CHANGE UP
-  TRIMETHOPRIM/SULFAMETHOXAZOLE: SIGNIFICANT CHANGE UP
-  VANCOMYCIN: SIGNIFICANT CHANGE UP
CULTURE RESULTS: SIGNIFICANT CHANGE UP
METHOD TYPE: SIGNIFICANT CHANGE UP
ORGANISM # SPEC MICROSCOPIC CNT: SIGNIFICANT CHANGE UP
ORGANISM # SPEC MICROSCOPIC CNT: SIGNIFICANT CHANGE UP
SPECIMEN SOURCE: SIGNIFICANT CHANGE UP

## 2022-06-30 PROCEDURE — 80202 ASSAY OF VANCOMYCIN: CPT

## 2022-06-30 PROCEDURE — 82306 VITAMIN D 25 HYDROXY: CPT

## 2022-06-30 PROCEDURE — 71045 X-RAY EXAM CHEST 1 VIEW: CPT | Mod: 26

## 2022-06-30 PROCEDURE — 84100 ASSAY OF PHOSPHORUS: CPT

## 2022-06-30 PROCEDURE — 81001 URINALYSIS AUTO W/SCOPE: CPT

## 2022-06-30 PROCEDURE — 82435 ASSAY OF BLOOD CHLORIDE: CPT

## 2022-06-30 PROCEDURE — 99239 HOSP IP/OBS DSCHRG MGMT >30: CPT

## 2022-06-30 PROCEDURE — 84145 PROCALCITONIN (PCT): CPT

## 2022-06-30 PROCEDURE — 94640 AIRWAY INHALATION TREATMENT: CPT

## 2022-06-30 PROCEDURE — 84484 ASSAY OF TROPONIN QUANT: CPT

## 2022-06-30 PROCEDURE — 84443 ASSAY THYROID STIM HORMONE: CPT

## 2022-06-30 PROCEDURE — 82330 ASSAY OF CALCIUM: CPT

## 2022-06-30 PROCEDURE — 97530 THERAPEUTIC ACTIVITIES: CPT

## 2022-06-30 PROCEDURE — 85014 HEMATOCRIT: CPT

## 2022-06-30 PROCEDURE — 99285 EMERGENCY DEPT VISIT HI MDM: CPT

## 2022-06-30 PROCEDURE — 93005 ELECTROCARDIOGRAM TRACING: CPT

## 2022-06-30 PROCEDURE — 87150 DNA/RNA AMPLIFIED PROBE: CPT

## 2022-06-30 PROCEDURE — 97116 GAIT TRAINING THERAPY: CPT

## 2022-06-30 PROCEDURE — 82803 BLOOD GASES ANY COMBINATION: CPT

## 2022-06-30 PROCEDURE — 85027 COMPLETE CBC AUTOMATED: CPT

## 2022-06-30 PROCEDURE — 83036 HEMOGLOBIN GLYCOSYLATED A1C: CPT

## 2022-06-30 PROCEDURE — 94760 N-INVAS EAR/PLS OXIMETRY 1: CPT

## 2022-06-30 PROCEDURE — 85610 PROTHROMBIN TIME: CPT

## 2022-06-30 PROCEDURE — 84295 ASSAY OF SERUM SODIUM: CPT

## 2022-06-30 PROCEDURE — 87186 SC STD MICRODIL/AGAR DIL: CPT

## 2022-06-30 PROCEDURE — 0225U NFCT DS DNA&RNA 21 SARSCOV2: CPT

## 2022-06-30 PROCEDURE — 82947 ASSAY GLUCOSE BLOOD QUANT: CPT

## 2022-06-30 PROCEDURE — 87040 BLOOD CULTURE FOR BACTERIA: CPT

## 2022-06-30 PROCEDURE — 80053 COMPREHEN METABOLIC PANEL: CPT

## 2022-06-30 PROCEDURE — 96375 TX/PRO/DX INJ NEW DRUG ADDON: CPT

## 2022-06-30 PROCEDURE — 80061 LIPID PANEL: CPT

## 2022-06-30 PROCEDURE — 85730 THROMBOPLASTIN TIME PARTIAL: CPT

## 2022-06-30 PROCEDURE — 87077 CULTURE AEROBIC IDENTIFY: CPT

## 2022-06-30 PROCEDURE — 96365 THER/PROPH/DIAG IV INF INIT: CPT

## 2022-06-30 PROCEDURE — C8929: CPT

## 2022-06-30 PROCEDURE — 96367 TX/PROPH/DG ADDL SEQ IV INF: CPT

## 2022-06-30 PROCEDURE — 83735 ASSAY OF MAGNESIUM: CPT

## 2022-06-30 PROCEDURE — 71045 X-RAY EXAM CHEST 1 VIEW: CPT

## 2022-06-30 PROCEDURE — 85018 HEMOGLOBIN: CPT

## 2022-06-30 PROCEDURE — 83605 ASSAY OF LACTIC ACID: CPT

## 2022-06-30 PROCEDURE — 82962 GLUCOSE BLOOD TEST: CPT

## 2022-06-30 PROCEDURE — 83880 ASSAY OF NATRIURETIC PEPTIDE: CPT

## 2022-06-30 PROCEDURE — 85025 COMPLETE CBC W/AUTO DIFF WBC: CPT

## 2022-06-30 PROCEDURE — 87086 URINE CULTURE/COLONY COUNT: CPT

## 2022-06-30 PROCEDURE — 36415 COLL VENOUS BLD VENIPUNCTURE: CPT

## 2022-06-30 PROCEDURE — 84132 ASSAY OF SERUM POTASSIUM: CPT

## 2022-06-30 RX ORDER — PANTOPRAZOLE SODIUM 20 MG/1
1 TABLET, DELAYED RELEASE ORAL
Qty: 0 | Refills: 0 | DISCHARGE
Start: 2022-06-30

## 2022-06-30 RX ORDER — METOPROLOL TARTRATE 50 MG
0 TABLET ORAL
Qty: 0 | Refills: 3 | DISCHARGE

## 2022-06-30 RX ORDER — METOPROLOL TARTRATE 50 MG
1 TABLET ORAL
Qty: 0 | Refills: 0 | DISCHARGE
Start: 2022-06-30

## 2022-06-30 RX ADMIN — Medication 81 MILLIGRAM(S): at 12:45

## 2022-06-30 RX ADMIN — Medication 25 MILLIGRAM(S): at 05:38

## 2022-06-30 RX ADMIN — Medication 650 MILLIGRAM(S): at 05:54

## 2022-06-30 RX ADMIN — PANTOPRAZOLE SODIUM 40 MILLIGRAM(S): 20 TABLET, DELAYED RELEASE ORAL at 05:38

## 2022-06-30 RX ADMIN — ENOXAPARIN SODIUM 40 MILLIGRAM(S): 100 INJECTION SUBCUTANEOUS at 05:38

## 2022-06-30 RX ADMIN — Medication 40 MILLIGRAM(S): at 05:38

## 2022-06-30 NOTE — DISCHARGE NOTE PROVIDER - NSDCMRMEDTOKEN_GEN_ALL_CORE_FT
aspirin 81 mg oral tablet, chewable: 1 tab(s) orally once a day  METOPROLOL TARTRATE 25 MG TAB: take 1 tablet by mouth once daily with food  NAMZARIC 28 MG-10 MG CAPSULE: take 1 capsule by mouth every evening  VITAMIN D 50,000IU CAP:    aspirin 81 mg oral tablet, chewable: 1 tab(s) orally once a day  Metoprolol Tartrate 25 mg oral tablet: 1 tab(s) orally 2 times a day  NAMZARIC 28 MG-10 MG CAPSULE: take 1 capsule by mouth every evening  pantoprazole 40 mg oral delayed release tablet: 1 tab(s) orally once a day (before a meal)  VITAMIN D 50,000IU CAP:    aspirin 81 mg oral tablet, chewable: 1 tab(s) orally once a day  Metoprolol Tartrate 25 mg oral tablet: 1 tab(s) orally 2 times a day  NAMZARIC 28 MG-10 MG CAPSULE: take 1 capsule by mouth every evening  pantoprazole 40 mg oral delayed release tablet: 1 tab(s) orally once a day (before a meal)  predniSONE 10 mg oral tablet: 30mg x 1 day 7/1/22  20mg x 1 day 7/2/22  10mg x 1 day 7/3/22  5mg x 1 day 7/4/22  VITAMIN D 50,000IU CAP:

## 2022-06-30 NOTE — DISCHARGE NOTE PROVIDER - NSDCCPCAREPLAN_GEN_ALL_CORE_FT
PRINCIPAL DISCHARGE DIAGNOSIS  Diagnosis: Acute respiratory failure with hypercapnia  Assessment and Plan of Treatment: Respiratory status has improved. Successfully weaned off of oxygen back to room air. Steroid dose tapered gradually. Complete steroid taper as prescribed.      SECONDARY DISCHARGE DIAGNOSES  Diagnosis: Paroxysmal atrial fibrillation  Assessment and Plan of Treatment: During this admission in consultation with cardiology, metoprolol dose adjusted for optimal rate control. Follow up with cardiology/PCP outpatient.    Diagnosis: Acute on chronic diastolic congestive heart failure  Assessment and Plan of Treatment: Exacerbation has resolved. Follow up with cardiology outpatient.    Diagnosis: Dementia  Assessment and Plan of Treatment: Continue home medications. Follow up with PCP outpatient.

## 2022-06-30 NOTE — DISCHARGE NOTE PROVIDER - NSDCFUADDAPPT_GEN_ALL_CORE_FT
STAR pt-will need follow up b4 DC  [facility] referral thru [facility]  No meds to bed from [facility], pt has confusion and don't require a med review  Yellow STAR pack/folder given to tp        Appointment made with Pulmonologist Dr. Monterroso 7/14/22  at 10:45 AM . If you are unable to attend your pre-scheduled appointment, please contact the office directly at [phone] to reschedule  Appointment for cardio is pending

## 2022-06-30 NOTE — DISCHARGE NOTE PROVIDER - NSDCFUSCHEDAPPT_GEN_ALL_CORE_FT
Elier Monterroso  [facility]  PULED [address]  Scheduled Appointment: 07/14/2022     Alex Marroquin  [facility]  CARDIOLOGY [address]  Scheduled Appointment: 07/11/2022    Elier Monterroso  [facility]  [address]MMED [address]  Scheduled Appointment: 07/14/2022

## 2022-06-30 NOTE — DISCHARGE NOTE PROVIDER - HOSPITAL COURSE
89 yo female with Alzheimers dementia, COPD, diastolic HF, HTN admitted with acute hypoxic resp failure 2/2 acute decompensated HF with Afib with RVR, likely driven by COPD exacerbation provoked by acute viral PNA. Treated with IV solumedrol, eventually transitioned to PO prednisone with gradual taper. O2 weaned from NRM and pt is now on RA. Repeat blood cultures negative. Admission complicated by episode of Afib RVR, since resolved. Was seen by cardiology during this admission, metoprolol titrated to optimal rate control. Continue off AC as pt is fall risk. At this time pt is medically stable for discharge home with home care. Will follow up outpatient with PCP.

## 2022-06-30 NOTE — DISCHARGE NOTE PROVIDER - CARE PROVIDER_API CALL
Dmitriy Vega)  Cardiology; Cardiovascular Disease; Internal Medicine  [address] [place] [address]  Phone: [phone]  Fax: [phone]  Follow Up Time:

## 2022-06-30 NOTE — DISCHARGE NOTE PROVIDER - DETAILS OF MALNUTRITION DIAGNOSIS/DIAGNOSES
This patient has been assessed with a concern for Malnutrition and was treated during this hospitalization for the following Nutrition diagnosis/diagnoses:     -  06/26/2022: Severe protein-calorie malnutrition   -  06/26/2022: Underweight (BMI < 19)

## 2022-07-01 LAB
CULTURE RESULTS: SIGNIFICANT CHANGE UP
CULTURE RESULTS: SIGNIFICANT CHANGE UP
SPECIMEN SOURCE: SIGNIFICANT CHANGE UP
SPECIMEN SOURCE: SIGNIFICANT CHANGE UP

## 2022-07-05 ENCOUNTER — APPOINTMENT (OUTPATIENT)
Dept: CARE COORDINATION | Facility: HOME HEALTH | Age: 87
End: 2022-07-05

## 2022-07-05 VITALS
OXYGEN SATURATION: 93 % | DIASTOLIC BLOOD PRESSURE: 52 MMHG | SYSTOLIC BLOOD PRESSURE: 90 MMHG | TEMPERATURE: 98.2 F | RESPIRATION RATE: 15 BRPM | HEART RATE: 63 BPM

## 2022-07-05 DIAGNOSIS — G30.9 ALZHEIMER'S DISEASE, UNSPECIFIED: ICD-10-CM

## 2022-07-05 DIAGNOSIS — I50.32 CHRONIC DIASTOLIC (CONGESTIVE) HEART FAILURE: ICD-10-CM

## 2022-07-05 DIAGNOSIS — I10 ESSENTIAL (PRIMARY) HYPERTENSION: ICD-10-CM

## 2022-07-05 DIAGNOSIS — J18.9 PNEUMONIA, UNSPECIFIED ORGANISM: ICD-10-CM

## 2022-07-05 DIAGNOSIS — I48.91 UNSPECIFIED ATRIAL FIBRILLATION: ICD-10-CM

## 2022-07-05 DIAGNOSIS — J44.9 CHRONIC OBSTRUCTIVE PULMONARY DISEASE, UNSPECIFIED: ICD-10-CM

## 2022-07-05 DIAGNOSIS — F02.80 ALZHEIMER'S DISEASE, UNSPECIFIED: ICD-10-CM

## 2022-07-05 PROCEDURE — 99497 ADVNCD CARE PLAN 30 MIN: CPT

## 2022-07-05 PROCEDURE — 99496 TRANSJ CARE MGMT HIGH F2F 7D: CPT

## 2022-07-05 RX ORDER — GUAIFENESIN 100 MG/5ML
100 LIQUID ORAL
Qty: 600 | Refills: 0 | Status: ACTIVE | COMMUNITY
Start: 2022-07-05 | End: 1900-01-01

## 2022-07-05 RX ORDER — LEVOFLOXACIN 500 MG/1
500 TABLET, FILM COATED ORAL DAILY
Qty: 7 | Refills: 0 | Status: ACTIVE | COMMUNITY
Start: 2022-07-05 | End: 1900-01-01

## 2022-07-05 RX ORDER — METOPROLOL TARTRATE 25 MG/1
25 TABLET, FILM COATED ORAL
Qty: 90 | Refills: 0 | Status: ACTIVE | COMMUNITY
Start: 2022-04-15

## 2022-07-05 RX ORDER — MEMANTINE HYDROCHLORIDE AND DONEPEZIL HYDROCHLORIDE 28; 10 MG/1; MG/1
28-10 CAPSULE ORAL
Qty: 90 | Refills: 0 | Status: ACTIVE | COMMUNITY
Start: 2022-04-15

## 2022-07-05 RX ORDER — AZITHROMYCIN 250 MG/1
250 TABLET, FILM COATED ORAL
Qty: 6 | Refills: 0 | Status: DISCONTINUED | COMMUNITY
Start: 2022-06-24 | End: 2022-07-05

## 2022-07-05 RX ORDER — PREDNISONE 10 MG/1
10 TABLET ORAL
Qty: 7 | Refills: 0 | Status: ACTIVE | COMMUNITY
Start: 2022-06-30

## 2022-07-11 ENCOUNTER — APPOINTMENT (OUTPATIENT)
Dept: CARDIOLOGY | Facility: CLINIC | Age: 87
End: 2022-07-11

## 2022-07-14 ENCOUNTER — APPOINTMENT (OUTPATIENT)
Dept: PULMONOLOGY | Facility: CLINIC | Age: 87
End: 2022-07-14

## 2022-07-18 NOTE — ASSESSMENT
[FreeTextEntry1] : [] Pneumonia\par - possibly developing bacterial PNA\par - START Levaquin 500mgQD x 7days, guifenesin 600mg Q12hrs\par - continue breathing exercise hourly\par \par [] hypertension\par - slightly hypotensive on exam, asymptomatic\par - continue metoprolol as ordered with parameter\par \par [] COPD\par - stable on exam\par - completed prednisone course\par \par [] CHF\par - stable on exam, euvolemic, \par - not on diuretics. \par - monitor weight 2-3 times a week\par - low sodium DASH diet with 1.5L fluid restriction\par \par [] Afib\par - rate controlled\par - continue metoprolol\par \par [] Alzheimer's\par - stabe\par - continue Namzaric Q24hr\par \par Reviewed all medications at length with patient, All questions addressed. Worsening symptoms discussed with pt understanding. No issues or concerns at this time. Pt encouraged to call CCC/DWAYNE at [phone] w/any questions, concerns or issues. Will continue to follow up with patient status\par \par \par

## 2022-07-18 NOTE — HISTORY OF PRESENT ILLNESS
[Post-hospitalization from ___ Hospital] : Post-hospitalization from [unfilled] Hospital [Admitted on: ___] : The patient was admitted on [unfilled] [Discharged on ___] : discharged on [unfilled] [Discharge Summary] : discharge summary [Pertinent Labs] : pertinent labs [Discharge Med List] : discharge medication list [Med Reconciliation] : medication reconciliation has been completed [Patient Contacted By: ____] : and contacted by [unfilled] [FreeTextEntry2] : Copied from [employer]\par ' 89 yo female with Alzheimers dementia, COPD, diastolic HF, HTN admitted with acute hypoxic resp failure 2/2 acute decompensated HF with Afib with RVR, likely driven by COPD exacerbation provoked by acute viral PNA. Treated with IV solumedrol, eventually transitioned to PO prednisone with gradual taper. O2 weaned from NRM and pt is now on RA. Repeat blood cultures negative. Admission complicated by episode of Afib RVR, since resolved. Was seen by cardiology during this admission, metoprolol titrated to optimal rate control. Continue off AC as pt is fall risk. At this time pt is medically stable for discharge home with home care. Will follow up outpatient with PCP..'\par  \par Patient was being seen at home for post-discharge follow-up under [facility] COPD. Patient is AOx2, denies SOB, CP or dizziness. Daughter Millie states that patient has increased productive cough with increased lethargy. also PO intakes has been decreased. \par \par

## 2022-07-18 NOTE — HEALTH RISK ASSESSMENT
[Never] : Never [No] : No [No falls in past year] : Patient reported no falls in the past year [Medical reason not done] : Medical reason not done [Low Salt Diet] : low salt [General Adherence] : general adherence [Behavioral] : behavioral [With Patient/Caregiver] : , with patient/caregiver [Reviewed no changes] : Reviewed, no changes [Designated Healthcare Proxy] : Designated healthcare proxy [Name: ___] : Health Care Proxy's Name: [unfilled]  [Relationship: ___] : Relationship: [unfilled] [DNR] : DNR [DNI] : DNI [de-identified] : dementia [de-identified] : assistance needed [de-identified] : assistance needed [I will adhere to the patient's wishes.] : I will adhere to the patient's wishes. [Time Spent: ___ minutes] : Time Spent: [unfilled] minutes [AdvancecareDate] : 7/5/2022 [FreeTextEntry4] : Patient was not able to participate in the discussion, Daughter states that the patient was very high functioning prior to this hospitalization. Expecting the patient to fully recover back to baseline. However, considering the patient's age and frailty, Daughter wants to remain code status to DNR/DNI..

## 2022-07-18 NOTE — COUNSELING
[Fall prevention counseling provided] : Fall prevention counseling provided [Adequate lighting] : Adequate lighting [No throw rugs] : No throw rugs [Use proper foot wear] : Use proper foot wear [Use recommended devices] : Use recommended devices [None] : None [Good understanding] : Patient has a good understanding of lifestyle changes and steps needed to achieve self management goal [de-identified] : teaching provided to daughter

## 2022-07-18 NOTE — PHYSICAL EXAM
[No Acute Distress] : no acute distress [Ill-Appearing] : ill-appearing [Cachectic] : cachexia was observed [Normal Sclera/Conjunctiva] : normal sclera/conjunctiva [PERRL] : pupils equal round and reactive to light [Normal Outer Ear/Nose] : the outer ears and nose were normal in appearance [Normal Oropharynx] : the oropharynx was normal [No JVD] : no jugular venous distention [Supple] : supple [No Lymphadenopathy] : no lymphadenopathy [Thyroid Normal, No Nodules] : the thyroid was normal and there were no nodules present [No Respiratory Distress] : no respiratory distress  [No Accessory Muscle Use] : no accessory muscle use [Normal Rate] : normal rate  [Regular Rhythm] : with a regular rhythm [Normal S1, S2] : normal S1 and S2 [No Murmur] : no murmur heard [No Carotid Bruits] : no carotid bruits [No Abdominal Bruit] : a ~M bruit was not heard ~T in the abdomen [No Varicosities] : no varicosities [Pedal Pulses Present] : the pedal pulses are present [No Extremity Clubbing/Cyanosis] : no extremity clubbing/cyanosis [Soft] : abdomen soft [Non Tender] : non-tender [Non-distended] : non-distended [No Masses] : no abdominal mass palpated [No HSM] : no HSM [Normal Bowel Sounds] : normal bowel sounds [No CVA Tenderness] : no CVA  tenderness [No Spinal Tenderness] : no spinal tenderness [No Joint Swelling] : no joint swelling [Grossly Normal Strength/Tone] : grossly normal strength/tone [No Rash] : no rash [Coordination Grossly Intact] : coordination grossly intact [No Focal Deficits] : no focal deficits [Memory Grossly Normal] : memory grossly normal [Normal Affect] : the affect was normal [Normal Mood] : the mood was normal [Normal Insight/Judgement] : insight and judgment were intact [de-identified] : diffused crackles throughout
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
